# Patient Record
Sex: FEMALE | Race: BLACK OR AFRICAN AMERICAN | NOT HISPANIC OR LATINO | Employment: FULL TIME | URBAN - METROPOLITAN AREA
[De-identification: names, ages, dates, MRNs, and addresses within clinical notes are randomized per-mention and may not be internally consistent; named-entity substitution may affect disease eponyms.]

---

## 2023-02-08 PROBLEM — R73.03 PREDIABETES: Status: ACTIVE | Noted: 2023-02-08

## 2023-02-08 PROBLEM — N91.2 AMENORRHEA: Status: ACTIVE | Noted: 2023-02-08

## 2023-02-08 PROBLEM — J02.0 STREP PHARYNGITIS: Status: RESOLVED | Noted: 2023-02-08 | Resolved: 2023-02-08

## 2023-02-08 PROBLEM — E66.01 CLASS 3 SEVERE OBESITY WITHOUT SERIOUS COMORBIDITY WITH BODY MASS INDEX (BMI) OF 40.0 TO 44.9 IN ADULT (MULTI): Status: ACTIVE | Noted: 2023-02-08

## 2023-02-08 PROBLEM — K12.1 ULCER MOUTH: Status: RESOLVED | Noted: 2023-02-08 | Resolved: 2023-02-08

## 2023-02-08 PROBLEM — E66.813 CLASS 3 SEVERE OBESITY WITHOUT SERIOUS COMORBIDITY WITH BODY MASS INDEX (BMI) OF 40.0 TO 44.9 IN ADULT: Status: ACTIVE | Noted: 2023-02-08

## 2023-02-08 PROBLEM — I26.99 PULMONARY EMBOLISM (MULTI): Status: ACTIVE | Noted: 2023-02-08

## 2023-02-08 PROBLEM — J02.9 SORE THROAT: Status: RESOLVED | Noted: 2023-02-08 | Resolved: 2023-02-08

## 2023-02-08 RX ORDER — SEMAGLUTIDE 1.34 MG/ML
INJECTION, SOLUTION SUBCUTANEOUS
COMMUNITY
End: 2023-04-05 | Stop reason: SDUPTHER

## 2023-04-05 ENCOUNTER — OFFICE VISIT (OUTPATIENT)
Dept: PRIMARY CARE | Facility: CLINIC | Age: 28
End: 2023-04-05
Payer: COMMERCIAL

## 2023-04-05 VITALS
TEMPERATURE: 97 F | DIASTOLIC BLOOD PRESSURE: 77 MMHG | OXYGEN SATURATION: 100 % | WEIGHT: 270.8 LBS | HEART RATE: 94 BPM | HEIGHT: 68 IN | SYSTOLIC BLOOD PRESSURE: 108 MMHG | BODY MASS INDEX: 41.04 KG/M2

## 2023-04-05 DIAGNOSIS — R73.03 PREDIABETES: ICD-10-CM

## 2023-04-05 DIAGNOSIS — E66.01 CLASS 3 SEVERE OBESITY DUE TO EXCESS CALORIES WITHOUT SERIOUS COMORBIDITY WITH BODY MASS INDEX (BMI) OF 40.0 TO 44.9 IN ADULT (MULTI): Primary | ICD-10-CM

## 2023-04-05 PROCEDURE — 1036F TOBACCO NON-USER: CPT | Performed by: NURSE PRACTITIONER

## 2023-04-05 PROCEDURE — 99213 OFFICE O/P EST LOW 20 MIN: CPT | Performed by: NURSE PRACTITIONER

## 2023-04-05 PROCEDURE — 3008F BODY MASS INDEX DOCD: CPT | Performed by: NURSE PRACTITIONER

## 2023-04-05 RX ORDER — SEMAGLUTIDE 1.34 MG/ML
INJECTION, SOLUTION SUBCUTANEOUS
Qty: 1.5 ML | Refills: 2 | Status: SHIPPED | OUTPATIENT
Start: 2023-04-05 | End: 2023-04-11

## 2023-04-05 RX ORDER — BUTALBITAL, ACETAMINOPHEN AND CAFFEINE 50; 325; 40 MG/1; MG/1; MG/1
2 TABLET ORAL EVERY 4 HOURS PRN
COMMUNITY
Start: 2023-02-27 | End: 2023-08-08 | Stop reason: ALTCHOICE

## 2023-04-05 NOTE — PROGRESS NOTES
Subjective   Patient ID: Matti Womack is a 27 y.o. female who presents for med fuv.    HPI     She has lost 15 lbs on Ozempic in the last 6 weeks. Her diet is better. She is limiting her sugar intake. She is more active but does not go to the gym. She feels more energized.     She does note some constipation, worse with increasing her dose to 0.5 mg. She has a bowel movement about once every 3 days. She has not taken anything for the constipation.     She has not yet scheduled with a dietician but plans to do so.     Review of Systems  ROS negative except as noted above in HPI.       Objective   /77   Pulse 94   Temp 36.1 °C (97 °F)   Wt 123 kg (270 lb 12.8 oz)   SpO2 100%     Physical Exam  General: Alert and oriented, in no acute distress. Appears stated age, well-nourished, and well hydrated  HEENT:  - Head: Normocephalic and atraumatic   - Eyes: EOMI, PERRLA  - ENT: Hearing grossly intact  Heart: RRR. No murmurs, clicks, or rubs  Lungs: Unlabored breathing. CTAB with no crackles, wheezes, or rhonchi  Abdomen: Normal BS in all 4 quadrants. Soft, non-tender, non-distended, with no masses  Extremities: Warm and well perfused. No edema. Normal peripheral pulses  Musculoskeletal: Normal gait and station  Neurological: Alert and oriented. No gross neurological deficits  Psychological: Appropriate mood and affect  Skin: No rash, abnormal lesions, cyanosis, or erythema      Assessment/Plan   Prediabetes  Obesity  - Has lost 15 lbs on Ozempic in the last 6 weeks  - Continue Ozempic 0.5 mg weekly  - Recommended stool softener to help with constipation  - Continue improved diet, encouraged to exercise  - Schedule with dietician    RTC in 3 months for annual physical and prediabetes/obesity    Reviewed and approved by LEA SUÁREZ on 4/5/23 at 12:25 PM.

## 2023-04-10 DIAGNOSIS — R73.03 PREDIABETES: ICD-10-CM

## 2023-04-10 DIAGNOSIS — E66.01 CLASS 3 SEVERE OBESITY DUE TO EXCESS CALORIES WITHOUT SERIOUS COMORBIDITY WITH BODY MASS INDEX (BMI) OF 40.0 TO 44.9 IN ADULT (MULTI): ICD-10-CM

## 2023-04-11 DIAGNOSIS — R73.03 PREDIABETES: Primary | ICD-10-CM

## 2023-04-11 DIAGNOSIS — E66.01 CLASS 3 SEVERE OBESITY DUE TO EXCESS CALORIES WITHOUT SERIOUS COMORBIDITY WITH BODY MASS INDEX (BMI) OF 40.0 TO 44.9 IN ADULT (MULTI): ICD-10-CM

## 2023-04-11 RX ORDER — SEMAGLUTIDE 1.34 MG/ML
INJECTION, SOLUTION SUBCUTANEOUS
OUTPATIENT
Start: 2023-04-11

## 2023-04-26 ENCOUNTER — TELEPHONE (OUTPATIENT)
Dept: PRIMARY CARE | Facility: CLINIC | Age: 28
End: 2023-04-26

## 2023-07-05 ENCOUNTER — APPOINTMENT (OUTPATIENT)
Dept: PRIMARY CARE | Facility: CLINIC | Age: 28
End: 2023-07-05
Payer: COMMERCIAL

## 2023-07-13 ENCOUNTER — APPOINTMENT (OUTPATIENT)
Dept: PRIMARY CARE | Facility: CLINIC | Age: 28
End: 2023-07-13
Payer: COMMERCIAL

## 2023-07-20 ENCOUNTER — APPOINTMENT (OUTPATIENT)
Dept: PRIMARY CARE | Facility: CLINIC | Age: 28
End: 2023-07-20
Payer: COMMERCIAL

## 2023-08-08 ENCOUNTER — TELEMEDICINE (OUTPATIENT)
Dept: PRIMARY CARE | Facility: CLINIC | Age: 28
End: 2023-08-08
Payer: COMMERCIAL

## 2023-08-08 VITALS
DIASTOLIC BLOOD PRESSURE: 71 MMHG | WEIGHT: 274 LBS | SYSTOLIC BLOOD PRESSURE: 104 MMHG | BODY MASS INDEX: 41.52 KG/M2 | HEIGHT: 68 IN | HEART RATE: 79 BPM

## 2023-08-08 DIAGNOSIS — Z00.00 ANNUAL PHYSICAL EXAM: Primary | ICD-10-CM

## 2023-08-08 DIAGNOSIS — D64.9 ANEMIA, UNSPECIFIED TYPE: ICD-10-CM

## 2023-08-08 DIAGNOSIS — G43.E09 CHRONIC MIGRAINE WITH AURA: ICD-10-CM

## 2023-08-08 DIAGNOSIS — E66.01 OBESITY, CLASS III, BMI 40-49.9 (MORBID OBESITY) (MULTI): ICD-10-CM

## 2023-08-08 DIAGNOSIS — R73.03 PREDIABETES: ICD-10-CM

## 2023-08-08 PROBLEM — E66.813 OBESITY, CLASS III, BMI 40-49.9 (MORBID OBESITY): Status: ACTIVE | Noted: 2017-07-27

## 2023-08-08 PROBLEM — J45.909 ASTHMA (HHS-HCC): Status: ACTIVE | Noted: 2019-02-01

## 2023-08-08 PROBLEM — G40.909 SEIZURE DISORDER (MULTI): Status: ACTIVE | Noted: 2019-02-01

## 2023-08-08 LAB
CHOLESTEROL (MG/DL) IN SER/PLAS: 125 MG/DL (ref 0–199)
CHOLESTEROL IN HDL (MG/DL) IN SER/PLAS: 40.6 MG/DL
CHOLESTEROL/HDL RATIO: 3.1
FERRITIN (UG/LL) IN SER/PLAS: 81 UG/L (ref 8–150)
IRON (UG/DL) IN SER/PLAS: 41 UG/DL (ref 35–150)
IRON BINDING CAPACITY (UG/DL) IN SER/PLAS: 374 UG/DL (ref 240–445)
IRON SATURATION (%) IN SER/PLAS: 11 % (ref 25–45)
LDL: 72 MG/DL (ref 0–99)
POC HEMOGLOBIN A1C: 5.9 % (ref 4.2–6.5)
TRANSFERRIN (MG/DL) IN SER/PLAS: 285 MG/DL (ref 200–360)
TRIGLYCERIDE (MG/DL) IN SER/PLAS: 64 MG/DL (ref 0–149)
VLDL: 13 MG/DL (ref 0–40)

## 2023-08-08 PROCEDURE — 99214 OFFICE O/P EST MOD 30 MIN: CPT | Performed by: NURSE PRACTITIONER

## 2023-08-08 PROCEDURE — 84466 ASSAY OF TRANSFERRIN: CPT

## 2023-08-08 PROCEDURE — 80061 LIPID PANEL: CPT

## 2023-08-08 PROCEDURE — 99395 PREV VISIT EST AGE 18-39: CPT | Performed by: NURSE PRACTITIONER

## 2023-08-08 PROCEDURE — 83540 ASSAY OF IRON: CPT

## 2023-08-08 PROCEDURE — 82728 ASSAY OF FERRITIN: CPT

## 2023-08-08 PROCEDURE — 83036 HEMOGLOBIN GLYCOSYLATED A1C: CPT | Performed by: NURSE PRACTITIONER

## 2023-08-08 RX ORDER — SUMATRIPTAN 50 MG/1
50 TABLET, FILM COATED ORAL ONCE AS NEEDED
Qty: 9 TABLET | Refills: 0 | Status: SHIPPED | OUTPATIENT
Start: 2023-08-08 | End: 2023-09-11

## 2023-08-08 NOTE — PROGRESS NOTES
Matti Womack is a 28 y.o. female who is presenting for annual physical exam.     Reports chronic migraines. Reports migraines either to one side of her head or her forehead. Gets associated light and noise sensitivity. Gets an aura. Denies nausea or vomiting. Unsure what triggers them. Gets a migraines about 4 times per month. They last anywhere from a few hours to a day.  Motrin 800 mg and Fioricet helps a little. Would like to try something other than Fioricet.     Last  Visit: unknown  Reported Health: Fair    Dental, Vision, Hearing:  Regular dental visits: no  - Brushes teeth 2 times per day  - Flosses? no, not regularly  Vision problems: yes  - Wears glasses or contacts? Supposed to wear glasses   - Last eye exam:  unknown  Hearing loss: no    Immunization status:  Up to date: yes    Lifestyle:  Healthy diet: okay  Regular exercise: no, active though  - Exercise frequency: none  - Type of exercise: none  Alcohol: yes, occasionally   Tobacco: no  Drugs: yes, marjiuana    Reproductive Health:  Menstrual problems: no  - LMP: does not get them on IUD  Sexually active: yes, one male partner  Contraception: IUD    Pregnancy History:   : 4  Parity: 4  - Full term: 4  - Premature:  - (s):  - Living:  - AB Induced:  - AB Spont:  - Ectopic:   - Multiple births:    Cervical Cancer Screening:  Last pap: unknown date2018?  History of abnormal pap smear? no  Breast Cancer Screening:  Last mammogram:  none  Colorectal Cancer Screening:  Last colonoscopy or Cologuard:  none  Metabolic Screening:  Lipid profile: May 2022  Glucose screening: 2023    Gen: denies fever, chills, weight loss, fatigue  HEENT: denies sinus pressure, sinus congestion, runny nose, red eyes, itchy eyes, vision loss, ear pain, hearing loss, throat pain, trouble swallowing  Neck: denies neck pain, neck swelling or masses  Chest/breast: denies breast pain, breast lumps, nipple discharge  CV: denies chest pain, palpitations,  fast heart rate, syncope  Resp: denies shortness of breath, cough, wheezing  GI: denies abdominal pain, nausea, diarrhea, constipation, hematochezia, melena  : denies dysuria, hematuria, vaginal discharge, frequency  Endo: denies polydipsia, polyuria, heat/cold intolerance, weight change, hair thinning  Heme: denies easy bruising, easy bleeding  Neuro: as noted in HPI. denies numbness, tingling, memory loss, changes in vision  MSK: denies joint pain, joint swelling, weakness  Psych: denies suicidal ideation, homicidal ideation, depression, anxiety, mood swings  Skin: denies rashes, abnormal lesions, itching, changes in moles     Previous History  Past Medical History:   Diagnosis Date    Encounter for removal of intrauterine contraceptive device 05/05/2016    Encounter for removal of intrauterine contraceptive device    Encounter for routine postpartum follow-up 04/28/2014    Postpartum exam    Encounter for supervision of normal pregnancy, unspecified, second trimester 02/17/2017    Second trimester pregnancy    Encounter for supervision of normal pregnancy, unspecified, third trimester 05/19/2017    Third trimester pregnancy at less than 36 weeks    Other specified health status 05/16/2014    Contraception    Personal history of contraception 04/28/2014    Personal history of contraceptive use    Sore throat 02/08/2023    Strep pharyngitis 02/08/2023    Ulcer mouth 02/08/2023     No past surgical history on file.  Social History     Tobacco Use    Smoking status: Never    Smokeless tobacco: Never     Family History   Problem Relation Name Age of Onset    No Known Problems Mother       No Known Allergies  Current Outpatient Medications   Medication Instructions    butalbital-acetaminophen-caff -40 mg tablet 2 tablets, oral, Every 4 hours PRN    semaglutide 0.5 mg, subcutaneous, Weekly       Physical Exam  General: Alert and oriented, in no acute distress. Appears stated age, well-nourished, and well  hydrated  HEENT:  - Head: Normocephalic and atraumatic   - Eyes: EOMI, PERRLA  - ENT: Hearing grossly intact. Mucus membranes pink and moist without lesions. Tonsils present without swelling or exudates. Good dentition. TMs gray  Neck: Supple. No stiffness. No thyromegaly or thyroid nodules  Heart: RRR. No murmurs, clicks, or rubs  Lungs: Unlabored breathing. CTAB with no crackles, wheezes, or rhonchi  Abdomen: Normal BS in all 4 quadrants. Soft, non-tender, non-distended, with no masses  Extremities: Warm and well perfused. No edema. Normal peripheral pulses  Musculoskeletal: ROM intact. Strength 5/5 in BUE and BLE. No joint swelling. Normal gait and station  Neurological: Alert and oriented. No gross neurological deficits. Normal sensation. No weakness. DTRs +2/4   Psychological: Appropriate mood and affect  Skin: No rash, abnormal lesions, cyanosis, or erythema      Assessment and Plan     Annual Physical  - Up to date on vaccines  - Due for pap, will return at a later date  - Check labs: lipid panel, CBCd, iron studies  - Declined STI screening  - Maintain healthy lifestyle    Chronic migraines  - RX sumatriptan 50 mg PRN  - Encouraged to keep headache diary  - Avoidance of known triggers    Prediabetes  - IO HgA1c 5.9%  - Schedule with dietician  - Lifestyle management    RTC in 2 weeks for pap    AXEL Sheldon-CNP  [facility]

## 2023-08-10 DIAGNOSIS — D64.9 ANEMIA, UNSPECIFIED TYPE: Primary | ICD-10-CM

## 2023-08-22 ENCOUNTER — APPOINTMENT (OUTPATIENT)
Dept: PRIMARY CARE | Facility: CLINIC | Age: 28
End: 2023-08-22
Payer: COMMERCIAL

## 2023-09-07 ENCOUNTER — TELEPHONE (OUTPATIENT)
Dept: PRIMARY CARE | Facility: CLINIC | Age: 28
End: 2023-09-07
Payer: COMMERCIAL

## 2023-09-07 NOTE — TELEPHONE ENCOUNTER
Spoke with patient       Yes there are plenty of options, but we will need to discuss at her appointment.

## 2023-09-09 DIAGNOSIS — G43.E09 CHRONIC MIGRAINE WITH AURA: ICD-10-CM

## 2023-09-11 RX ORDER — SUMATRIPTAN 50 MG/1
50 TABLET, FILM COATED ORAL ONCE AS NEEDED
Qty: 9 TABLET | Refills: 0 | Status: SHIPPED | OUTPATIENT
Start: 2023-09-11 | End: 2023-09-22

## 2023-09-22 ENCOUNTER — PROCEDURE VISIT (OUTPATIENT)
Dept: PRIMARY CARE | Facility: CLINIC | Age: 28
End: 2023-09-22
Payer: COMMERCIAL

## 2023-09-22 VITALS
OXYGEN SATURATION: 100 % | HEIGHT: 68 IN | BODY MASS INDEX: 41.46 KG/M2 | SYSTOLIC BLOOD PRESSURE: 109 MMHG | WEIGHT: 273.6 LBS | HEART RATE: 77 BPM | DIASTOLIC BLOOD PRESSURE: 72 MMHG

## 2023-09-22 DIAGNOSIS — Z11.3 ROUTINE SCREENING FOR STI (SEXUALLY TRANSMITTED INFECTION): ICD-10-CM

## 2023-09-22 DIAGNOSIS — Z12.4 CERVICAL CANCER SCREENING: ICD-10-CM

## 2023-09-22 DIAGNOSIS — G43.109 MIGRAINE WITH AURA AND WITHOUT STATUS MIGRAINOSUS, NOT INTRACTABLE: Primary | ICD-10-CM

## 2023-09-22 DIAGNOSIS — D64.9 ANEMIA, UNSPECIFIED TYPE: ICD-10-CM

## 2023-09-22 LAB
BASOPHILS (10*3/UL) IN BLOOD BY AUTOMATED COUNT: 0.04 X10E9/L (ref 0–0.1)
BASOPHILS/100 LEUKOCYTES IN BLOOD BY AUTOMATED COUNT: 0.7 % (ref 0–2)
EOSINOPHILS (10*3/UL) IN BLOOD BY AUTOMATED COUNT: 0.09 X10E9/L (ref 0–0.7)
EOSINOPHILS/100 LEUKOCYTES IN BLOOD BY AUTOMATED COUNT: 1.5 % (ref 0–6)
ERYTHROCYTE DISTRIBUTION WIDTH (RATIO) BY AUTOMATED COUNT: 18.2 % (ref 11.5–14.5)
ERYTHROCYTE MEAN CORPUSCULAR HEMOGLOBIN CONCENTRATION (G/DL) BY AUTOMATED: 30.4 G/DL (ref 32–36)
ERYTHROCYTE MEAN CORPUSCULAR VOLUME (FL) BY AUTOMATED COUNT: 80 FL (ref 80–100)
ERYTHROCYTES (10*6/UL) IN BLOOD BY AUTOMATED COUNT: 4.62 X10E12/L (ref 4–5.2)
HEMATOCRIT (%) IN BLOOD BY AUTOMATED COUNT: 36.8 % (ref 36–46)
HEMOGLOBIN (G/DL) IN BLOOD: 11.2 G/DL (ref 12–16)
IMMATURE GRANULOCYTES/100 LEUKOCYTES IN BLOOD BY AUTOMATED COUNT: 0.2 % (ref 0–0.9)
LEUKOCYTES (10*3/UL) IN BLOOD BY AUTOMATED COUNT: 6.1 X10E9/L (ref 4.4–11.3)
LYMPHOCYTES (10*3/UL) IN BLOOD BY AUTOMATED COUNT: 2.53 X10E9/L (ref 1.2–4.8)
LYMPHOCYTES/100 LEUKOCYTES IN BLOOD BY AUTOMATED COUNT: 41.4 % (ref 13–44)
MONOCYTES (10*3/UL) IN BLOOD BY AUTOMATED COUNT: 0.58 X10E9/L (ref 0.1–1)
MONOCYTES/100 LEUKOCYTES IN BLOOD BY AUTOMATED COUNT: 9.5 % (ref 2–10)
NEUTROPHILS (10*3/UL) IN BLOOD BY AUTOMATED COUNT: 2.86 X10E9/L (ref 1.2–7.7)
NEUTROPHILS/100 LEUKOCYTES IN BLOOD BY AUTOMATED COUNT: 46.7 % (ref 40–80)
NRBC (PER 100 WBCS) BY AUTOMATED COUNT: 0 /100 WBC (ref 0–0)
PLATELETS (10*3/UL) IN BLOOD AUTOMATED COUNT: 196 X10E9/L (ref 150–450)

## 2023-09-22 PROCEDURE — 87591 N.GONORRHOEAE DNA AMP PROB: CPT

## 2023-09-22 PROCEDURE — 99214 OFFICE O/P EST MOD 30 MIN: CPT | Performed by: NURSE PRACTITIONER

## 2023-09-22 PROCEDURE — 87661 TRICHOMONAS VAGINALIS AMPLIF: CPT

## 2023-09-22 PROCEDURE — 88175 CYTOPATH C/V AUTO FLUID REDO: CPT

## 2023-09-22 PROCEDURE — 85025 COMPLETE CBC W/AUTO DIFF WBC: CPT

## 2023-09-22 PROCEDURE — 87491 CHLMYD TRACH DNA AMP PROBE: CPT

## 2023-09-22 RX ORDER — RIZATRIPTAN BENZOATE 5 MG/1
5 TABLET, ORALLY DISINTEGRATING ORAL ONCE AS NEEDED
Qty: 9 TABLET | Refills: 0 | Status: SHIPPED | OUTPATIENT
Start: 2023-09-22 | End: 2023-10-23

## 2023-09-22 RX ORDER — TOPIRAMATE 25 MG/1
TABLET ORAL
Qty: 70 TABLET | Refills: 0 | Status: SHIPPED | OUTPATIENT
Start: 2023-09-22 | End: 2023-10-19

## 2023-09-22 NOTE — PATIENT INSTRUCTIONS
"Migraines in adults    The Basics  Written by the doctors and editors at [facility]  What are migraines? -- Migraines are a kind of headache that can also involve other symptoms. Migraines can affect both adults and children. They are more common in females than in males. Migraines often start mild and then get worse.  What are the symptoms of migraines in adults? -- Symptoms can include:  ?Headache - The headache gets worse over several hours and is usually throbbing. It often affects 1 side of the head.  ?Nausea and sometimes vomiting  ?Sensitivity to light and noise - Lying down in a quiet, dark room often helps.  ?Aura - Some people have something called a migraine \"aura.\" An aura is a symptom or feeling that happens before or during the migraine headache. Each person's aura is different, but in most cases the aura affects your vision. You might see flashing lights, bright spots, or zig-zag lines, or lose part of your vision. Or you might have numbness and tingling of the lips, lower face, and fingers of 1 hand. Some people hear sounds or have ringing in their ears as part of their aura. The aura usually lasts a few minutes to an hour and then goes away, but most often lasts 15 to 30 minutes.  People who get migraines with aura usually cannot take birth control pills. That's because they might increase the risk of stroke.  Many people get other symptoms of migraine that happen several hours or even a day before the headache. Doctors call these \"premonitory\" or \"prodromal\" symptoms. They might include yawning, feeling depressed, irritability, food cravings, constipation, or a stiff neck.  Is there a test for migraines? -- No. There is no test. But your doctor should be able to tell if you have migraines by doing an exam and learning about your symptoms.  Should I see a doctor or nurse? -- Yes. If you think you are having migraines, you should talk to your doctor or nurse. You should also see a doctor or nurse if " "your migraines get worse or more frequent, or if you have new symptoms.  Is there anything I can do to prevent migraines? -- Yes. Some people find that their migraines are triggered by certain things. If you can avoid some of these things, you can lower your chances of getting migraines.  You can also keep a \"headache diary.\" In the diary, write down every time you have a migraine and what you ate and did before it started. That way you can find out if there is anything you should avoid eating or doing. You can also write down what medicine you took and whether or not it helped.  Common migraine triggers include:  ?Stress  ?Hormonal changes  ?Skipping meals or not eating enough  ?Changes in the weather  ?Sleeping too much or too little  ?Bright or flashing lights  ?Drinking alcohol  ?Eating certain foods, such as aged cheese and hot dogs  ?Smoking or being around smoke  If your migraines are frequent or severe, your doctor can suggest other ways to help prevent them. For example, it might help to learn relaxation techniques and ways to manage stress. There are also medicines that can help.  Some people get migraines just before or during their period. Medicine can help with this, too.  How are migraines treated? -- There are many different medicines that can help with migraines. Your doctor can help you find the best treatment for your situation.  For mild migraines, your doctor might suggest an over-the-counter medicine such as acetaminophen (sample brand name: Tylenol), ibuprofen (sample brand names: Advil, Motrin), or naproxen (sample brand name: Aleve). There is also a medicine that combines acetaminophen, aspirin, and caffeine (sample brand name: Excedrin).  For more severe migraines, there are prescription medicines that can help. Some, such as medicines called triptans, help to relieve the pain from a migraine attack. Other prescription medicines can help to make migraine attacks happen less often. If you " have severe nausea or vomiting with your migraines, there are medicines that can help with that, too.  Do not try to treat frequent migraines on your own with non-prescription pain medicines. Taking non-prescription pain medicines too often can actually cause more headaches later.  Is there anything I can do on my own to feel better? -- Yes. You can try:  ?Resting in a quiet, dark room with a cool cloth on your forehead  ?Sleeping  ?Taking the medicine or medicines that you have talked to your doctor about - You should not take medicines that you have not discussed with your doctor.  What if I want to get pregnant? -- If you want to get pregnant, talk to your doctor or nurse before you start trying. Some medicines used to treat and prevent migraines are not safe during pregnancy, so you might need to switch medicines before you get pregnant.  Some people notice that their migraines actually get better during pregnancy and breastfeeding. This is related to hormonal changes in the body.  All topics are updated as new evidence becomes available and our peer review process is complete.  This topic retrieved from [employer] on: Sep 05, 2023.  Topic 398420 Version 7.0  Release: 31.3.5 - C31.247  © 2023 [employer], [employer]. and/or its affiliates. All rights reserved.

## 2023-09-23 LAB
CHLAMYDIA TRACH., AMPLIFIED: NEGATIVE
N. GONORRHEA, AMPLIFIED: NEGATIVE
TRICHOMONAS VAGINALIS: NEGATIVE

## 2023-09-26 DIAGNOSIS — D50.9 IRON DEFICIENCY ANEMIA, UNSPECIFIED IRON DEFICIENCY ANEMIA TYPE: Primary | ICD-10-CM

## 2023-09-26 RX ORDER — FERROUS GLUCONATE 324(38)MG
TABLET ORAL
Qty: 84 TABLET | Refills: 1 | Status: SHIPPED | OUTPATIENT
Start: 2023-09-26

## 2023-10-06 ENCOUNTER — TELEPHONE (OUTPATIENT)
Dept: PRIMARY CARE | Facility: CLINIC | Age: 28
End: 2023-10-06
Payer: COMMERCIAL

## 2023-10-06 LAB
COMPLETE PATHOLOGY REPORT: NORMAL
CONVERTED CLINICAL DIAGNOSIS-HISTORY: NORMAL
CONVERTED DIAGNOSIS COMMENT: NORMAL
CONVERTED FINAL DIAGNOSIS: NORMAL
CONVERTED FINAL REPORT PDF LINK TO COPY AND PASTE: NORMAL

## 2023-10-19 DIAGNOSIS — G43.109 MIGRAINE WITH AURA AND WITHOUT STATUS MIGRAINOSUS, NOT INTRACTABLE: ICD-10-CM

## 2023-10-19 RX ORDER — TOPIRAMATE 50 MG/1
50 TABLET, FILM COATED ORAL 2 TIMES DAILY
Qty: 180 TABLET | Refills: 1 | Status: SHIPPED | OUTPATIENT
Start: 2023-10-19 | End: 2024-04-19 | Stop reason: SDUPTHER

## 2023-10-20 ENCOUNTER — OFFICE VISIT (OUTPATIENT)
Dept: PRIMARY CARE | Facility: CLINIC | Age: 28
End: 2023-10-20
Payer: COMMERCIAL

## 2023-10-20 VITALS
HEART RATE: 75 BPM | WEIGHT: 264.4 LBS | OXYGEN SATURATION: 98 % | DIASTOLIC BLOOD PRESSURE: 72 MMHG | HEIGHT: 68 IN | BODY MASS INDEX: 40.07 KG/M2 | SYSTOLIC BLOOD PRESSURE: 106 MMHG

## 2023-10-20 DIAGNOSIS — G43.109 MIGRAINE WITH AURA AND WITHOUT STATUS MIGRAINOSUS, NOT INTRACTABLE: Primary | ICD-10-CM

## 2023-10-20 PROCEDURE — 3008F BODY MASS INDEX DOCD: CPT | Performed by: NURSE PRACTITIONER

## 2023-10-20 PROCEDURE — 99213 OFFICE O/P EST LOW 20 MIN: CPT | Performed by: NURSE PRACTITIONER

## 2023-10-20 PROCEDURE — 1036F TOBACCO NON-USER: CPT | Performed by: NURSE PRACTITIONER

## 2023-10-20 RX ORDER — IBUPROFEN 800 MG/1
800 TABLET ORAL 3 TIMES DAILY PRN
Qty: 30 TABLET | Refills: 0 | Status: SHIPPED | OUTPATIENT
Start: 2023-10-20 | End: 2024-04-19 | Stop reason: SDUPTHER

## 2023-10-20 NOTE — PROGRESS NOTES
"Subjective   Patient ID: Matti Womack is a 28 y.o. female who presents for Migraine (Fuv/).    HPI     She was started on topiramate at her last appointment on 9/22/23. She states it's been working great. She had not had a migraine or headache up until 3 days ago. She started taking three 25 mg tablets BID then. She has had a migraine the last 3 days, relieved with ibuprofen. (Admits to taking five 200 mg tablets). She has not tried Rizatriptan yet.     Unsure of what triggers her migraines. Believes it's work stress. Headaches are located to either one side of her head or her forehead. Gets associated light and noise sensitivity. Gets an aura. Denies nausea or vomiting.     Review of Systems  ROS negative except as noted above in HPI.       Objective   /72   Pulse 75   Ht 1.727 m (5' 8\")   Wt 120 kg (264 lb 6.4 oz)   SpO2 98%   BMI 40.20 kg/m²     Physical Exam  General: Alert and oriented, in no acute distress. Appears stated age, well-nourished, and well hydrated  HEENT:  - Head: Normocephalic and atraumatic   - Eyes: EOMI, PERRLA  - ENT: Hearing grossly intact  Heart: RRR. No murmurs, clicks, or rubs  Lungs: Unlabored breathing. CTAB with no crackles, wheezes, or rhonchi  Abdomen: Normal BS in all 4 quadrants. Soft, non-tender, non-distended, with no masses  Extremities: Warm and well perfused. No edema. Normal peripheral pulses  Musculoskeletal: Normal gait and station  Neurological: Alert and oriented. No gross neurological deficits  Psychological: Appropriate mood and affect  Skin: No rash, abnormal lesions, cyanosis, or erythema      Assessment/Plan   Migraines  - Improved, did have a headache the last 3 days and increased her topiramate to 75 mg BID on her own  - Will go back to topiramate 50 mg BID (does not seem that 75 mg BID was helping anyway since she still got headaches with this dose)  - Rx sent for ibuprofen 800 mg TID prn migraines (advised max dose is 800 mg Q8H)  - Avoidance of " known triggers when possible  - Can take rizatriptan prn     RTC in 3 months for migraines or sooner prn    AXEL Sheldon-CNP  [facility]

## 2023-10-23 DIAGNOSIS — G43.109 MIGRAINE WITH AURA AND WITHOUT STATUS MIGRAINOSUS, NOT INTRACTABLE: ICD-10-CM

## 2023-10-23 RX ORDER — RIZATRIPTAN BENZOATE 5 MG/1
5 TABLET, ORALLY DISINTEGRATING ORAL ONCE AS NEEDED
Qty: 9 TABLET | Refills: 0 | Status: SHIPPED | OUTPATIENT
Start: 2023-10-23 | End: 2024-01-19

## 2023-11-01 DIAGNOSIS — G43.109 MIGRAINE WITH AURA AND WITHOUT STATUS MIGRAINOSUS, NOT INTRACTABLE: Primary | ICD-10-CM

## 2024-01-11 DIAGNOSIS — G43.109 MIGRAINE WITH AURA AND WITHOUT STATUS MIGRAINOSUS, NOT INTRACTABLE: Primary | ICD-10-CM

## 2024-01-19 ENCOUNTER — OFFICE VISIT (OUTPATIENT)
Dept: PRIMARY CARE | Facility: CLINIC | Age: 29
End: 2024-01-19
Payer: COMMERCIAL

## 2024-01-19 VITALS
BODY MASS INDEX: 40.16 KG/M2 | DIASTOLIC BLOOD PRESSURE: 78 MMHG | WEIGHT: 265 LBS | SYSTOLIC BLOOD PRESSURE: 113 MMHG | HEART RATE: 82 BPM | HEIGHT: 68 IN

## 2024-01-19 DIAGNOSIS — G40.A09 ABSENCE SEIZURE (MULTI): ICD-10-CM

## 2024-01-19 DIAGNOSIS — E66.01 OBESITY, CLASS III, BMI 40-49.9 (MORBID OBESITY) (MULTI): ICD-10-CM

## 2024-01-19 DIAGNOSIS — J45.20 MILD INTERMITTENT ASTHMA WITHOUT COMPLICATION (HHS-HCC): Primary | ICD-10-CM

## 2024-01-19 DIAGNOSIS — G43.109 MIGRAINE WITH AURA AND WITHOUT STATUS MIGRAINOSUS, NOT INTRACTABLE: ICD-10-CM

## 2024-01-19 DIAGNOSIS — R29.818 SUSPECTED SLEEP APNEA: ICD-10-CM

## 2024-01-19 PROBLEM — O99.210 MATERNAL MORBID OBESITY, ANTEPARTUM (MULTI): Status: RESOLVED | Noted: 2019-02-21 | Resolved: 2024-01-19

## 2024-01-19 PROBLEM — O99.210 MATERNAL MORBID OBESITY, ANTEPARTUM (MULTI): Status: ACTIVE | Noted: 2019-02-21

## 2024-01-19 PROBLEM — I26.99 PULMONARY EMBOLISM (MULTI): Status: ACTIVE | Noted: 2018-06-14

## 2024-01-19 PROBLEM — I26.99 PULMONARY EMBOLISM (MULTI): Status: RESOLVED | Noted: 2018-06-14 | Resolved: 2024-01-19

## 2024-01-19 PROCEDURE — 3008F BODY MASS INDEX DOCD: CPT | Performed by: NURSE PRACTITIONER

## 2024-01-19 PROCEDURE — 99214 OFFICE O/P EST MOD 30 MIN: CPT | Performed by: NURSE PRACTITIONER

## 2024-01-19 PROCEDURE — 1036F TOBACCO NON-USER: CPT | Performed by: NURSE PRACTITIONER

## 2024-01-19 RX ORDER — ALBUTEROL SULFATE 90 UG/1
2 AEROSOL, METERED RESPIRATORY (INHALATION) EVERY 4 HOURS PRN
Qty: 18 G | Refills: 1 | Status: SHIPPED | OUTPATIENT
Start: 2024-01-19 | End: 2025-01-18

## 2024-01-19 RX ORDER — LEVONORGESTREL 52 MG/1
1 INTRAUTERINE DEVICE INTRAUTERINE ONCE
COMMUNITY

## 2024-01-19 ASSESSMENT — ENCOUNTER SYMPTOMS
LOSS OF SENSATION IN FEET: 0
DEPRESSION: 0
OCCASIONAL FEELINGS OF UNSTEADINESS: 0

## 2024-01-19 NOTE — PROGRESS NOTES
"Subjective   Patient ID: Matti Womack is a 28 y.o. female who presents for chronic care    HPI     Compliant with topiramate. Getting headaches once or twice per week and getting migraines about once per week. She did have a headache daily last week. Mild headaches are located to her forehead. States her migraines are also located to her forehead and feels like someone is tying a tight string around her head. Gets associated light and noise sensitivity. Gets an aura. Denies nausea or vomiting.  She will take an ibuprofen 800 mg which does help with her headaches. Has not tried taking rizaptriptan because it makes her drowsy. She has not picked up Nurtec because insurance is waiting on a PA. Unsure of what triggers her migraines. Believes it's work stress.   She does wake up with at times headaches. She does snore. Denies coughing or choking in her sleep. Denies witnessed apneic episodes.     Asthma well controlled. Uses a nebulizer a few times per year, usually if she's sick.     Has not had a seizure since 2014. She states she will blank stare and had numbness/tingling to her extremities.     Review of Systems  ROS negative except as noted above in HPI.       Objective   /78   Pulse 82   Ht 1.727 m (5' 8\")   Wt 120 kg (265 lb)   BMI 40.29 kg/m²     Physical Exam  General: Alert and oriented, in no acute distress. Appears stated age, well-nourished, and well hydrated  HEENT:  - Head: Normocephalic and atraumatic   - Eyes: EOMI, PERRLA  - ENT: Hearing grossly intact  Heart: RRR. No murmurs, clicks, or rubs  Lungs: Unlabored breathing. CTAB with no crackles, wheezes, or rhonchi  Abdomen: Normal BS in all 4 quadrants. Soft, non-tender, non-distended, with no masses  Extremities: Warm and well perfused. No edema. Normal peripheral pulses  Musculoskeletal: Normal gait and station  Neurological: Alert and oriented. No gross neurological deficits  Psychological: Appropriate mood and affect  Skin: No rash, " abnormal lesions, cyanosis, or erythema      Assessment/Plan   Migraines  - Getting migraines about once per week  - Continue topiramate 50 mg BID  - Can continue ibuprofen 800 mg TID prn, does not take rizatriptan because it makes her drowsy  - Waiting on PA for Nurtec   - Avoidance of known triggers when possible; advised to keep a headache diary   - If migraines do not improve, will need to consider a different preventative medication    Suspected KEVIN  - Patient snores and occasionally wakes up with headaches  - Sleep study ordered    Asthma  - Well controlled, rarely uses albuterol  - New rx placed for albuterol as she only has a nebulizer     Absence seizures  - Has not had a seizure since 2014    RTC in 3 months for chronic care or sooner prn    AXEL Sheldon-CNP  [facility]

## 2024-02-06 DIAGNOSIS — G43.109 MIGRAINE WITH AURA AND WITHOUT STATUS MIGRAINOSUS, NOT INTRACTABLE: Primary | ICD-10-CM

## 2024-02-27 DIAGNOSIS — N76.0 ACUTE VAGINITIS: Primary | ICD-10-CM

## 2024-02-27 RX ORDER — METRONIDAZOLE 500 MG/1
500 TABLET ORAL 2 TIMES DAILY
Qty: 14 TABLET | Refills: 0 | Status: SHIPPED | OUTPATIENT
Start: 2024-02-27 | End: 2024-03-05

## 2024-04-19 ENCOUNTER — OFFICE VISIT (OUTPATIENT)
Dept: PRIMARY CARE | Facility: CLINIC | Age: 29
End: 2024-04-19
Payer: COMMERCIAL

## 2024-04-19 VITALS
HEART RATE: 86 BPM | HEIGHT: 68 IN | WEIGHT: 261 LBS | DIASTOLIC BLOOD PRESSURE: 73 MMHG | SYSTOLIC BLOOD PRESSURE: 128 MMHG | BODY MASS INDEX: 39.56 KG/M2

## 2024-04-19 DIAGNOSIS — Z11.3 ROUTINE SCREENING FOR STI (SEXUALLY TRANSMITTED INFECTION): ICD-10-CM

## 2024-04-19 DIAGNOSIS — G43.109 MIGRAINE WITH AURA AND WITHOUT STATUS MIGRAINOSUS, NOT INTRACTABLE: Primary | ICD-10-CM

## 2024-04-19 DIAGNOSIS — J45.20 MILD INTERMITTENT ASTHMA WITHOUT COMPLICATION (HHS-HCC): ICD-10-CM

## 2024-04-19 PROBLEM — G40.909 SEIZURE DISORDER (MULTI): Status: ACTIVE | Noted: 2019-02-01

## 2024-04-19 PROBLEM — E66.9 OBESITY WITH BODY MASS INDEX 30 OR GREATER: Status: ACTIVE | Noted: 2017-07-27

## 2024-04-19 PROCEDURE — 99214 OFFICE O/P EST MOD 30 MIN: CPT | Performed by: NURSE PRACTITIONER

## 2024-04-19 PROCEDURE — 87800 DETECT AGNT MULT DNA DIREC: CPT

## 2024-04-19 PROCEDURE — 87661 TRICHOMONAS VAGINALIS AMPLIF: CPT

## 2024-04-19 PROCEDURE — 1036F TOBACCO NON-USER: CPT | Performed by: NURSE PRACTITIONER

## 2024-04-19 RX ORDER — IBUPROFEN 800 MG/1
800 TABLET ORAL 3 TIMES DAILY PRN
Qty: 30 TABLET | Refills: 0 | Status: SHIPPED | OUTPATIENT
Start: 2024-04-19

## 2024-04-19 RX ORDER — TOPIRAMATE 50 MG/1
50 TABLET, FILM COATED ORAL 2 TIMES DAILY
Qty: 180 TABLET | Refills: 3 | Status: SHIPPED | OUTPATIENT
Start: 2024-04-19 | End: 2025-04-14

## 2024-04-19 ASSESSMENT — ENCOUNTER SYMPTOMS
DEPRESSION: 1
LOSS OF SENSATION IN FEET: 0
OCCASIONAL FEELINGS OF UNSTEADINESS: 0

## 2024-04-19 NOTE — PROGRESS NOTES
"Subjective   Patient ID: Matti Womack is a 28 y.o. female who presents for chronic care.     HPI     1. Migraines  Has only had one migraine in the last month  Compliant with topiramate  When she gets a migraine, she takes ibuprofen which typically resolves her symptoms  Has rizatriptan but does not take it  Ubrelvy and Nurtec were not approved by her insurance  Thinks her triggers are stress     2. Asthma  Well controlled  Cannot remember the last time she needed albuterol  If she ever does need albuterol, it's typically with weather changes    3. STI screening  Requesting STI screening  She states she recently had 3 male partners in a short time frame  Denies any symptoms at this time    Review of Systems  ROS negative except as noted above in HPI.     Objective   /73   Pulse 86   Ht 1.727 m (5' 8\")   Wt 118 kg (261 lb)   BMI 39.68 kg/m²     Physical Exam  General: Alert and oriented, in no acute distress. Appears stated age, well-nourished, and well hydrated  HEENT:  - Head: Normocephalic and atraumatic   - Eyes: EOMI, PERRLA  - ENT: Hearing grossly intact  Heart: RRR. No murmurs, clicks, or rubs  Lungs: Unlabored breathing. CTAB with no crackles, wheezes, or rhonchi  Abdomen: Normal BS in all 4 quadrants. Soft, non-tender, non-distended, with no masses  Extremities: Warm and well perfused. No edema. Normal peripheral pulses  Musculoskeletal: Normal gait and station  Neurological: Alert and oriented. No gross neurological deficits  Psychological: Appropriate mood and affect  Skin: No rash, abnormal lesions, cyanosis, or erythema    Assessment/Plan   1. Migraines  - Well controlled, has only had 1 in the last month  - Continue topiramate 50 mg BID   - Continue ibuprofen prn, can take rizatriptan prn but does not usually take it   - Avoidance of known triggers    2. Asthma  - Well controlled  - Continue albuterol prn    3. STI screening  - Check GC, chlamydia, trich  - Declined blood work     RTC in 4 " months for physical and chronic care or sooner prn    Lucina Ward, APRN-CNP  [facility]

## 2024-04-20 LAB
C TRACH RRNA SPEC QL NAA+PROBE: NEGATIVE
N GONORRHOEA DNA SPEC QL PROBE+SIG AMP: NEGATIVE
T VAGINALIS RRNA SPEC QL NAA+PROBE: NEGATIVE

## 2024-04-30 ENCOUNTER — HOSPITAL ENCOUNTER (EMERGENCY)
Facility: HOSPITAL | Age: 29
Discharge: HOME | End: 2024-04-30
Attending: STUDENT IN AN ORGANIZED HEALTH CARE EDUCATION/TRAINING PROGRAM
Payer: COMMERCIAL

## 2024-04-30 ENCOUNTER — APPOINTMENT (OUTPATIENT)
Dept: RADIOLOGY | Facility: HOSPITAL | Age: 29
End: 2024-04-30
Payer: COMMERCIAL

## 2024-04-30 VITALS
TEMPERATURE: 98.6 F | OXYGEN SATURATION: 100 % | HEART RATE: 96 BPM | HEIGHT: 68 IN | DIASTOLIC BLOOD PRESSURE: 70 MMHG | BODY MASS INDEX: 39.4 KG/M2 | RESPIRATION RATE: 18 BRPM | WEIGHT: 260 LBS | SYSTOLIC BLOOD PRESSURE: 115 MMHG
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
## 2024-04-30 DIAGNOSIS — S09.90XA HEAD INJURY, INITIAL ENCOUNTER: Primary | ICD-10-CM

## 2024-04-30 PROCEDURE — 70486 CT MAXILLOFACIAL W/O DYE: CPT | Performed by: STUDENT IN AN ORGANIZED HEALTH CARE EDUCATION/TRAINING PROGRAM

## 2024-04-30 PROCEDURE — 70450 CT HEAD/BRAIN W/O DYE: CPT

## 2024-04-30 PROCEDURE — 70450 CT HEAD/BRAIN W/O DYE: CPT | Performed by: STUDENT IN AN ORGANIZED HEALTH CARE EDUCATION/TRAINING PROGRAM

## 2024-04-30 PROCEDURE — 70486 CT MAXILLOFACIAL W/O DYE: CPT

## 2024-04-30 PROCEDURE — 99285 EMERGENCY DEPT VISIT HI MDM: CPT | Mod: 25

## 2024-04-30 PROCEDURE — 2500000001 HC RX 250 WO HCPCS SELF ADMINISTERED DRUGS (ALT 637 FOR MEDICARE OP): Performed by: STUDENT IN AN ORGANIZED HEALTH CARE EDUCATION/TRAINING PROGRAM

## 2024-04-30 RX ORDER — HYDROCODONE BITARTRATE AND ACETAMINOPHEN 5; 325 MG/1; MG/1
1 TABLET ORAL ONCE
Status: COMPLETED | OUTPATIENT
Start: 2024-04-30 | End: 2024-04-30

## 2024-04-30 RX ADMIN — HYDROCODONE BITARTRATE AND ACETAMINOPHEN 1 TABLET: 5; 325 TABLET ORAL at 19:01

## 2024-04-30 ASSESSMENT — PAIN SCALES - GENERAL
PAINLEVEL_OUTOF10: 0 - NO PAIN
PAINLEVEL_OUTOF10: 9
PAINLEVEL_OUTOF10: 7

## 2024-04-30 ASSESSMENT — PAIN DESCRIPTION - ORIENTATION
ORIENTATION: RIGHT
ORIENTATION: RIGHT

## 2024-04-30 ASSESSMENT — COLUMBIA-SUICIDE SEVERITY RATING SCALE - C-SSRS
1. IN THE PAST MONTH, HAVE YOU WISHED YOU WERE DEAD OR WISHED YOU COULD GO TO SLEEP AND NOT WAKE UP?: NO
2. HAVE YOU ACTUALLY HAD ANY THOUGHTS OF KILLING YOURSELF?: NO
6. HAVE YOU EVER DONE ANYTHING, STARTED TO DO ANYTHING, OR PREPARED TO DO ANYTHING TO END YOUR LIFE?: NO

## 2024-04-30 ASSESSMENT — PAIN DESCRIPTION - LOCATION
LOCATION: JAW
LOCATION: JAW

## 2024-04-30 ASSESSMENT — PAIN - FUNCTIONAL ASSESSMENT: PAIN_FUNCTIONAL_ASSESSMENT: 0-10
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
## 2024-05-01 NOTE — DISCHARGE INSTRUCTIONS
You have been seen at a [facility].  Please follow-up with your primary care provider in the next 1 to 2 days for further evaluation and routine follow-up.  Please return to the emergency room if having any worsening symptoms.  Please follow-up with any specialists if discussed during your emergency room stay.

## 2024-07-11 ENCOUNTER — APPOINTMENT (OUTPATIENT)
Dept: OBSTETRICS AND GYNECOLOGY | Facility: CLINIC | Age: 29
End: 2024-07-11
Payer: COMMERCIAL

## 2024-07-11 VITALS
WEIGHT: 256 LBS | DIASTOLIC BLOOD PRESSURE: 60 MMHG | BODY MASS INDEX: 38.8 KG/M2 | HEIGHT: 68 IN | SYSTOLIC BLOOD PRESSURE: 116 MMHG

## 2024-07-11 DIAGNOSIS — N89.8 VAGINAL DISCHARGE: ICD-10-CM

## 2024-07-11 DIAGNOSIS — Z00.00 HEALTHCARE MAINTENANCE: Primary | ICD-10-CM

## 2024-07-11 DIAGNOSIS — Z30.432 ENCOUNTER FOR IUD REMOVAL: ICD-10-CM

## 2024-07-11 PROCEDURE — 87661 TRICHOMONAS VAGINALIS AMPLIF: CPT

## 2024-07-11 PROCEDURE — 87491 CHLMYD TRACH DNA AMP PROBE: CPT

## 2024-07-11 PROCEDURE — 58301 REMOVE INTRAUTERINE DEVICE: CPT | Performed by: NURSE PRACTITIONER

## 2024-07-11 PROCEDURE — 87591 N.GONORRHOEAE DNA AMP PROB: CPT

## 2024-07-11 PROCEDURE — 1036F TOBACCO NON-USER: CPT | Performed by: NURSE PRACTITIONER

## 2024-07-11 PROCEDURE — 87205 SMEAR GRAM STAIN: CPT

## 2024-07-11 ASSESSMENT — ENCOUNTER SYMPTOMS
MUSCULOSKELETAL NEGATIVE: 0
ENDOCRINE NEGATIVE: 0
CONSTITUTIONAL NEGATIVE: 0
LOSS OF SENSATION IN FEET: 0
CARDIOVASCULAR NEGATIVE: 0
EYES NEGATIVE: 0
RESPIRATORY NEGATIVE: 0
ALLERGIC/IMMUNOLOGIC NEGATIVE: 0
DEPRESSION: 0
GASTROINTESTINAL NEGATIVE: 0
NEUROLOGICAL NEGATIVE: 0
PSYCHIATRIC NEGATIVE: 0
OCCASIONAL FEELINGS OF UNSTEADINESS: 0
HEMATOLOGIC/LYMPHATIC NEGATIVE: 0

## 2024-07-11 ASSESSMENT — LIFESTYLE VARIABLES
HOW MANY STANDARD DRINKS CONTAINING ALCOHOL DO YOU HAVE ON A TYPICAL DAY: PATIENT DOES NOT DRINK
HOW OFTEN DO YOU HAVE A DRINK CONTAINING ALCOHOL: NEVER
AUDIT-C TOTAL SCORE: 0
HOW OFTEN DO YOU HAVE SIX OR MORE DRINKS ON ONE OCCASION: NEVER
SKIP TO QUESTIONS 9-10: 1

## 2024-07-11 ASSESSMENT — PAIN SCALES - GENERAL: PAINLEVEL: 0-NO PAIN

## 2024-07-11 NOTE — PROGRESS NOTES
Patient ID: Matti Womack is a 29 y.o. female.    IUD Removal    Performed by: CHUCKY Sherman  Authorized by: CHUCKY Sherman    Procedure: IUD removal    Reason for removal: patient request    Strings visualized: yes    Tenaculum applied to cervix: no    IUD grasped by forceps: yes    Performed with ultrasound guidance: no    IUD removed: yes    Removed without complications: yes    IUD intact: yes    Cervix manually dilated: no          PNV RX sent to pharmacy    Vaginal discharge, very malodorous noted at time of removal.  Pt unaware of discharge.  Swabs obtained and sent to lab.

## 2024-07-12 LAB
C TRACH RRNA SPEC QL NAA+PROBE: NEGATIVE
CLUE CELLS VAG LPF-#/AREA: ABNORMAL /[LPF]
N GONORRHOEA DNA SPEC QL PROBE+SIG AMP: NEGATIVE
NUGENT SCORE: 1
T VAGINALIS RRNA SPEC QL NAA+PROBE: NEGATIVE
YEAST VAG WET PREP-#/AREA: PRESENT

## 2024-07-12 RX ORDER — FLUCONAZOLE 150 MG/1
150 TABLET ORAL ONCE
Qty: 2 TABLET | Refills: 0 | Status: SHIPPED | OUTPATIENT
Start: 2024-07-12 | End: 2024-07-12

## 2024-08-16 ENCOUNTER — APPOINTMENT (OUTPATIENT)
Dept: PRIMARY CARE | Facility: CLINIC | Age: 29
End: 2024-08-16
Payer: COMMERCIAL

## 2024-08-16 VITALS
DIASTOLIC BLOOD PRESSURE: 80 MMHG | HEIGHT: 68 IN | BODY MASS INDEX: 37.92 KG/M2 | WEIGHT: 250.2 LBS | HEART RATE: 80 BPM | SYSTOLIC BLOOD PRESSURE: 128 MMHG

## 2024-08-16 DIAGNOSIS — M25.561 CHRONIC PAIN OF BOTH KNEES: ICD-10-CM

## 2024-08-16 DIAGNOSIS — M79.671 BILATERAL FOOT PAIN: ICD-10-CM

## 2024-08-16 DIAGNOSIS — G43.109 MIGRAINE WITH AURA AND WITHOUT STATUS MIGRAINOSUS, NOT INTRACTABLE: ICD-10-CM

## 2024-08-16 DIAGNOSIS — Z31.9 DESIRE FOR PREGNANCY: ICD-10-CM

## 2024-08-16 DIAGNOSIS — Z00.00 ANNUAL PHYSICAL EXAM: Primary | ICD-10-CM

## 2024-08-16 DIAGNOSIS — M25.562 CHRONIC PAIN OF BOTH KNEES: ICD-10-CM

## 2024-08-16 DIAGNOSIS — G89.29 CHRONIC PAIN OF BOTH KNEES: ICD-10-CM

## 2024-08-16 DIAGNOSIS — R73.03 PREDIABETES: ICD-10-CM

## 2024-08-16 DIAGNOSIS — J45.20 MILD INTERMITTENT ASTHMA WITHOUT COMPLICATION (HHS-HCC): ICD-10-CM

## 2024-08-16 DIAGNOSIS — M79.672 BILATERAL FOOT PAIN: ICD-10-CM

## 2024-08-16 LAB
POC HEMOGLOBIN A1C: 5.4 % (ref 4.2–6.5)
PREGNANCY TEST URINE, POC: NEGATIVE

## 2024-08-16 PROCEDURE — 83036 HEMOGLOBIN GLYCOSYLATED A1C: CPT | Performed by: NURSE PRACTITIONER

## 2024-08-16 PROCEDURE — 81025 URINE PREGNANCY TEST: CPT | Performed by: NURSE PRACTITIONER

## 2024-08-16 PROCEDURE — 1036F TOBACCO NON-USER: CPT | Performed by: NURSE PRACTITIONER

## 2024-08-16 PROCEDURE — 99214 OFFICE O/P EST MOD 30 MIN: CPT | Performed by: NURSE PRACTITIONER

## 2024-08-16 PROCEDURE — 99395 PREV VISIT EST AGE 18-39: CPT | Performed by: NURSE PRACTITIONER

## 2024-08-16 PROCEDURE — 3008F BODY MASS INDEX DOCD: CPT | Performed by: NURSE PRACTITIONER

## 2024-08-16 RX ORDER — FLUCONAZOLE 150 MG/1
TABLET ORAL
COMMUNITY
Start: 2024-07-12 | End: 2024-08-16 | Stop reason: WASHOUT

## 2024-08-16 ASSESSMENT — ENCOUNTER SYMPTOMS
LOSS OF SENSATION IN FEET: 0
DEPRESSION: 0
OCCASIONAL FEELINGS OF UNSTEADINESS: 0

## 2024-08-16 NOTE — PATIENT INSTRUCTIONS
To wean off topiramate:   Take 1 tablet in the morning and 1/2 tablet in the evening for 1 week,   Then take 1/2 tablet twice daily for 1 week,   Then take 1/2 tablet in the morning for 1 week,   Then stop

## 2024-08-16 NOTE — LETTER
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    If you have any questions or concerns, please don't hesitate to call.         Sincerely,        Lucina Ward, AXEL-CNP    CC: No Recipients

## 2024-08-16 NOTE — PROGRESS NOTES
Matti Womack is a 29 y.o. female who is presenting for annual physical exam and chronic care. Would like a pregnancy test.        1. Migraines  Well controlled with topiramate, getting migraines about twice per month   When she gets a migraine, she takes ibuprofen which typically resolves her symptoms  Has rizatriptan but does not take it  Ubrelvy and Nurtec were not approved by her insurance  Thinks her triggers are stress      2. Asthma  Well controlled  Last used albuterol a couple weeks ago when it was very humid   If she ever does need albuterol, it's typically with weather changes    3. Try to conceive  She had her IUD removed last month and is trying to conceive and is requesting a pregnancy test  She is taking a prenatal vitamin   Using an pham on her phone to track ovulation    4. 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 yes  Vision problems: no  - Wears glasses or contacts? no  - Last eye exam:  years  Hearing loss: no    Immunization status:  Up to date: yes    Lifestyle:  Healthy diet: could be better  Regular exercise: no, active at work  Alcohol: no  Tobacco: no  Drugs: no    Reproductive Health:  Menstrual problems: no  - LMP:  24  Sexually active: yes  Contraception: no    Pregnancy History:   : 4  Parity: 4  - Full term:   - Premature:  - (s):  - Livin  - AB Induced:  - AB Spont:  - Ectopic:   -  Multiple births:    Cervical Cancer Screening:  Last pap:  9/22/23  History of abnormal pap smear? no  Breast Cancer Screening:  Last mammogram:  n/a  Colorectal Cancer Screening:  Last colonoscopy or Cologuard:  n/a  Metabolic Screening:  Lipid profile: 8/8/23  Glucose screen: 8/8/23    Review of Systems  Gen: denies fever, chills, weight loss, fatigue  HEENT: denies sinus pressure, sinus congestion, runny nose, red eyes, itchy eyes, vision loss, ear pain, hearing loss, throat pain, trouble swallowing  Neck: denies neck pain, neck swelling or masses  Chest/breast: denies breast pain, breast lumps, nipple discharge  CV: denies chest pain, palpitations, fast heart rate, syncope  Resp: denies shortness of breath, cough, wheezing  GI: denies abdominal pain, nausea, diarrhea, constipation, hematochezia, melena  : denies dysuria, hematuria, vaginal discharge, frequency  Endo: denies polydipsia, polyuria, heat/cold intolerance, weight change, hair thinning  Heme: denies easy bruising, easy bleeding  Neuro: as noted in HPI. denies numbness, tingling, memory loss, changes in vision  MSK: as noted in HPI. denies joint swelling, weakness  Psych: denies suicidal ideation, homicidal ideation, depression, anxiety, mood swings  Skin: denies rashes, abnormal lesions, itching, changes in moles     Previous History  Past Medical History:   Diagnosis Date    Encounter for removal of intrauterine contraceptive device 05/05/2016    Encounter for removal of intrauterine contraceptive device    Encounter for routine postpartum follow-up ([facility]) 04/28/2014    Postpartum exam    Encounter for supervision of normal pregnancy, unspecified, second trimester ([facility]) 02/17/2017    Second trimester pregnancy    Encounter for supervision of normal pregnancy, unspecified, third trimester ([facility]) 05/19/2017    Third trimester pregnancy at less than 36 weeks    Other specified health status 05/16/2014    Contraception    Personal history of  contraception 04/28/2014    Personal history of contraceptive use    Pulmonary embolism (Multi) 06/14/2018    Formatting of this note might be different from the original. see epic note 6/14/18  INTMED visit s/p hospitalization at [facility] 5/2018 for pulmonary embolus started on xarelto and eliquis which patient self d/c'd several weeks after MFM consult done  2/21/19 and started on Lovenox    Sore throat 02/08/2023    Strep pharyngitis 02/08/2023    Ulcer mouth 02/08/2023     History reviewed. No pertinent surgical history.  Social History     Tobacco Use    Smoking status: Never    Smokeless tobacco: Never   Substance Use Topics    Alcohol use: Yes     Comment: occasional    Drug use: Yes     Types: Marijuana     Comment: daily     Family History   Problem Relation Name Age of Onset    No Known Problems Mother       No Known Allergies  Current Outpatient Medications   Medication Instructions    albuterol 90 mcg/actuation inhaler 2 puffs, inhalation, Every 4 hours PRN    ferrous gluconate 324 (38 Fe) mg tablet Take 1 tablet with a meal 3 days per week    fluconazole (Diflucan) 150 mg tablet TAKE 1 TABLET (150 MG) BY MOUTH 1 TIME FOR 1 DOSE. REPEAT IN 72 HOURS IF NEEDED.    ibuprofen 800 mg, oral, 3 times daily PRN    levonorgestrel (Mirena) 21 mcg/24 hours (8 yrs) 52 mg IUD 1 each, intrauterine, Once    prenatal vitamin, iron-folic, 27 mg iron-800 mcg folic acid tablet 1 tablet, oral, Daily    rimegepant (NURTEC) 75 mg tablet,disintegrating Take 1 tablet daily as needed for migraine relief    rizatriptan MLT (MAXALT-MLT) 5 mg, oral, Once as needed, May repeat in 2 hours if unresolved. Do not exceed 30 mg in 24 hours.    topiramate (TOPAMAX) 50 mg, oral, 2 times daily       Physical Exam  General: Alert and oriented, in no acute distress. Appears stated age, well-nourished, and well hydrated  HEENT:  - Head: Normocephalic and atraumatic   - Eyes: EOMI, PERRLA  - ENT: Hearing grossly intact. Mucus membranes pink and  moist without lesions. Tonsils present without swelling or exudates. Good dentition. TMs gray  Neck: Supple. No stiffness. No thyromegaly or thyroid nodules  Heart: RRR. No murmurs, clicks, or rubs  Lungs: Unlabored breathing. CTAB with no crackles, wheezes, or rhonchi  Abdomen: Normal BS in all 4 quadrants. Soft, non-tender, non-distended, with no masses  Extremities: Warm and well perfused. No edema. Normal peripheral pulses  Musculoskeletal: ROM intact. Strength 5/5 in BUE and BLE. No joint swelling. Normal gait and station  Neurological: Alert and oriented. No gross neurological deficits. Normal sensation. No weakness. DTRs +2/4   Psychological: Appropriate mood and affect  Skin: No rash, abnormal lesions, cyanosis, or erythema      Assessment and Plan     1. Migraines  - Well controlled on topiramate, getting about 2 migraines per month  - Since she is trying to conceive, recommend weaning off topiramate  -- Instructions provided to do this  - Can take ibuprofen and rizaptriptan prn but if becomes pregnant she will need to stop  - Avoidance of known triggers    2. Asthma  - Well controlled  - Continue albuterol prn     3. 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 Desires pregnancy  - IO HCG negative (this was ran before I knew her LMP was 7/28)  - Continue prenatal vitamins    5. Prediabetes  - IO HgA1c 5.4%    5. Annual physical  - Up to date on vaccines  - Had recent STI screening  - Declined to check routine labs  - Maintain healthy lifestyle    RTC in 6 months for chronic care or sooner prn    AXEL Sheldon-CNP  [facility]

## 2024-09-05 ENCOUNTER — HOSPITAL ENCOUNTER (EMERGENCY)
Facility: HOSPITAL | Age: 29
Discharge: HOME | End: 2024-09-06
Payer: COMMERCIAL

## 2024-09-05 VITALS
HEART RATE: 95 BPM | OXYGEN SATURATION: 100 % | WEIGHT: 265 LBS | TEMPERATURE: 98 F | DIASTOLIC BLOOD PRESSURE: 78 MMHG | BODY MASS INDEX: 40.16 KG/M2 | HEIGHT: 68 IN | SYSTOLIC BLOOD PRESSURE: 141 MMHG | RESPIRATION RATE: 18 BRPM

## 2024-09-05 DIAGNOSIS — S93.401A SPRAIN OF RIGHT ANKLE, INITIAL ENCOUNTER: Primary | ICD-10-CM

## 2024-09-05 DIAGNOSIS — S69.92XA INJURY OF FINGER OF LEFT HAND, INITIAL ENCOUNTER: ICD-10-CM

## 2024-09-05 PROCEDURE — 99284 EMERGENCY DEPT VISIT MOD MDM: CPT

## 2024-09-05 ASSESSMENT — PAIN SCALES - GENERAL: PAINLEVEL_OUTOF10: 10 - WORST POSSIBLE PAIN

## 2024-09-05 ASSESSMENT — COLUMBIA-SUICIDE SEVERITY RATING SCALE - C-SSRS
2. HAVE YOU ACTUALLY HAD ANY THOUGHTS OF KILLING YOURSELF?: NO
6. HAVE YOU EVER DONE ANYTHING, STARTED TO DO ANYTHING, OR PREPARED TO DO ANYTHING TO END YOUR LIFE?: NO
1. IN THE PAST MONTH, HAVE YOU WISHED YOU WERE DEAD OR WISHED YOU COULD GO TO SLEEP AND NOT WAKE UP?: NO

## 2024-09-05 ASSESSMENT — PAIN - FUNCTIONAL ASSESSMENT: PAIN_FUNCTIONAL_ASSESSMENT: 0-10

## 2024-09-05 NOTE — Clinical Note
Matti Womack was seen and treated in our emergency department on 9/5/2024.  She may return to work on 09/09/2024.       If you have any questions or concerns, please don't hesitate to call.      Nadiya Martel PA-C

## 2024-09-06 ENCOUNTER — APPOINTMENT (OUTPATIENT)
Dept: RADIOLOGY | Facility: HOSPITAL | Age: 29
End: 2024-09-06
Payer: COMMERCIAL

## 2024-09-06 PROCEDURE — 73610 X-RAY EXAM OF ANKLE: CPT | Mod: RIGHT SIDE | Performed by: RADIOLOGY

## 2024-09-06 PROCEDURE — 73110 X-RAY EXAM OF WRIST: CPT | Mod: LEFT SIDE | Performed by: RADIOLOGY

## 2024-09-06 PROCEDURE — 73610 X-RAY EXAM OF ANKLE: CPT | Mod: RT

## 2024-09-06 PROCEDURE — 73130 X-RAY EXAM OF HAND: CPT | Mod: LT

## 2024-09-06 PROCEDURE — 73110 X-RAY EXAM OF WRIST: CPT | Mod: LT

## 2024-09-06 PROCEDURE — 73630 X-RAY EXAM OF FOOT: CPT | Mod: RIGHT SIDE | Performed by: RADIOLOGY

## 2024-09-06 PROCEDURE — 73630 X-RAY EXAM OF FOOT: CPT | Mod: RT

## 2024-09-06 PROCEDURE — 73130 X-RAY EXAM OF HAND: CPT | Mod: LEFT SIDE | Performed by: RADIOLOGY

## 2024-09-06 RX ORDER — ACETAMINOPHEN 325 MG/1
975 TABLET ORAL ONCE
Status: COMPLETED | OUTPATIENT
Start: 2024-09-06 | End: 2024-09-06

## 2024-09-06 RX ORDER — KETOROLAC TROMETHAMINE 30 MG/ML
15 INJECTION, SOLUTION INTRAMUSCULAR; INTRAVENOUS ONCE
Status: DISCONTINUED | OUTPATIENT
Start: 2024-09-06 | End: 2024-09-06 | Stop reason: HOSPADM
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
## 2024-09-14 DIAGNOSIS — Z00.00 HEALTHCARE MAINTENANCE: ICD-10-CM

## 2024-09-16 RX ORDER — B-COMPLEX WITH VITAMIN C
1 TABLET ORAL DAILY
Qty: 30 TABLET | Refills: 0 | Status: SHIPPED | OUTPATIENT
Start: 2024-09-16

## 2024-10-07 ENCOUNTER — APPOINTMENT (OUTPATIENT)
Dept: ORTHOPEDIC SURGERY | Facility: CLINIC | Age: 29
End: 2024-10-07
Payer: COMMERCIAL

## 2024-10-21 ENCOUNTER — INITIAL PRENATAL (OUTPATIENT)
Dept: OBSTETRICS AND GYNECOLOGY | Facility: CLINIC | Age: 29
End: 2024-10-21
Payer: COMMERCIAL

## 2024-10-21 VITALS — DIASTOLIC BLOOD PRESSURE: 77 MMHG | BODY MASS INDEX: 38.01 KG/M2 | WEIGHT: 250 LBS | SYSTOLIC BLOOD PRESSURE: 124 MMHG

## 2024-10-21 DIAGNOSIS — Z32.01 PREGNANCY TEST POSITIVE (HHS-HCC): Primary | ICD-10-CM

## 2024-10-21 DIAGNOSIS — Z86.711 HISTORY OF PULMONARY EMBOLISM: ICD-10-CM

## 2024-10-21 DIAGNOSIS — Z3A.01 LESS THAN 8 WEEKS GESTATION OF PREGNANCY (HHS-HCC): ICD-10-CM

## 2024-10-21 DIAGNOSIS — J45.20 MILD INTERMITTENT ASTHMA WITHOUT COMPLICATION (HHS-HCC): ICD-10-CM

## 2024-10-21 LAB — PREGNANCY TEST URINE, POC: POSITIVE

## 2024-10-21 PROCEDURE — 99214 OFFICE O/P EST MOD 30 MIN: CPT | Performed by: OBSTETRICS & GYNECOLOGY

## 2024-10-21 PROCEDURE — H1000 PRENATAL CARE ATRISK ASSESSM: HCPCS | Performed by: OBSTETRICS & GYNECOLOGY

## 2024-10-21 PROCEDURE — 81025 URINE PREGNANCY TEST: CPT | Performed by: OBSTETRICS & GYNECOLOGY

## 2024-10-21 RX ORDER — ENOXAPARIN SODIUM 100 MG/ML
40 INJECTION SUBCUTANEOUS DAILY
Qty: 30 EACH | Refills: 11 | Status: SHIPPED | OUTPATIENT
Start: 2024-10-21 | End: 2025-10-21

## 2024-10-21 SDOH — ECONOMIC STABILITY: FOOD INSECURITY: WITHIN THE PAST 12 MONTHS, YOU WORRIED THAT YOUR FOOD WOULD RUN OUT BEFORE YOU GOT MONEY TO BUY MORE.: NEVER TRUE

## 2024-10-21 SDOH — HEALTH STABILITY: PHYSICAL HEALTH: ON AVERAGE, HOW MANY MINUTES DO YOU ENGAGE IN EXERCISE AT THIS LEVEL?: 0 MIN

## 2024-10-21 SDOH — ECONOMIC STABILITY: FOOD INSECURITY: WITHIN THE PAST 12 MONTHS, THE FOOD YOU BOUGHT JUST DIDN'T LAST AND YOU DIDN'T HAVE MONEY TO GET MORE.: NEVER TRUE

## 2024-10-21 SDOH — HEALTH STABILITY: PHYSICAL HEALTH: ON AVERAGE, HOW MANY DAYS PER WEEK DO YOU ENGAGE IN MODERATE TO STRENUOUS EXERCISE (LIKE A BRISK WALK)?: 0 DAYS

## 2024-10-21 ASSESSMENT — EDINBURGH POSTNATAL DEPRESSION SCALE (EPDS)
I HAVE BEEN ABLE TO LAUGH AND SEE THE FUNNY SIDE OF THINGS: AS MUCH AS I ALWAYS COULD
TOTAL SCORE: 6
I HAVE BEEN ANXIOUS OR WORRIED FOR NO GOOD REASON: HARDLY EVER
I HAVE FELT SAD OR MISERABLE: NOT VERY OFTEN
THINGS HAVE BEEN GETTING ON TOP OF ME: NO, MOST OF THE TIME I HAVE COPED QUITE WELL
THE THOUGHT OF HARMING MYSELF HAS OCCURRED TO ME: NEVER
I HAVE BEEN SO UNHAPPY THAT I HAVE BEEN CRYING: NO, NEVER
I HAVE LOOKED FORWARD WITH ENJOYMENT TO THINGS: AS MUCH AS I EVER DID
I HAVE LOOKED FORWARD WITH ENJOYMENT TO THINGS: AS MUCH AS I EVER DID
I HAVE BEEN SO UNHAPPY THAT I HAVE HAD DIFFICULTY SLEEPING: NOT VERY OFTEN
I HAVE BEEN ANXIOUS OR WORRIED FOR NO GOOD REASON: HARDLY EVER
I HAVE BEEN ABLE TO LAUGH AND SEE THE FUNNY SIDE OF THINGS: AS MUCH AS I ALWAYS COULD
THE THOUGHT OF HARMING MYSELF HAS OCCURRED TO ME: NEVER
I HAVE BEEN SO UNHAPPY THAT I HAVE HAD DIFFICULTY SLEEPING: NOT VERY OFTEN
I HAVE FELT SCARED OR PANICKY FOR NO GOOD REASON: NO, NOT MUCH
THINGS HAVE BEEN GETTING ON TOP OF ME: NO, MOST OF THE TIME I HAVE COPED QUITE WELL
TOTAL SCORE: 6
I HAVE FELT SCARED OR PANICKY FOR NO GOOD REASON: NO, NOT MUCH
I HAVE BLAMED MYSELF UNNECESSARILY WHEN THINGS WENT WRONG: NOT VERY OFTEN
I HAVE BLAMED MYSELF UNNECESSARILY WHEN THINGS WENT WRONG: NOT VERY OFTEN
I HAVE BEEN SO UNHAPPY THAT I HAVE BEEN CRYING: NO, NEVER
I HAVE FELT SAD OR MISERABLE: NOT VERY OFTEN

## 2024-10-21 ASSESSMENT — ENCOUNTER SYMPTOMS
NEUROLOGICAL NEGATIVE: 0
CARDIOVASCULAR NEGATIVE: 0
PSYCHIATRIC NEGATIVE: 0
EYES NEGATIVE: 0
MUSCULOSKELETAL NEGATIVE: 0
CONSTITUTIONAL NEGATIVE: 0
ENDOCRINE NEGATIVE: 0
ALLERGIC/IMMUNOLOGIC NEGATIVE: 0
HEMATOLOGIC/LYMPHATIC NEGATIVE: 0
RESPIRATORY NEGATIVE: 0
GASTROINTESTINAL NEGATIVE: 0

## 2024-10-21 NOTE — PROGRESS NOTES
Subjective   Patient ID [id number]   Matti Womack is a 29 y.o.  at 7w5d with a working estimated date of delivery of 2025, by Last Menstrual Period who presents for an initial prenatal visit. This pregnancy is planned.    Her pregnancy is complicated by:  History of pulmonary embolism  Asthma    OB History    Para Term  AB Living   5 4 4     4   SAB IAB Ectopic Multiple Live Births           4      # Outcome Date GA Lbr Veens/2nd Weight Sex Type Anes PTL Lv   5 Current            4 Term 20    M Vag-Spont EPI  JESSY   3 Term 19    F Vag-Spont EPI  JESSY   2 Term 17    F Vag-Spont EPI  JESSY   1 Term 14    F Vag-Spont EPI  JESSY     [place]  Depression Scale Total: 6    Objective   Physical Exam  Weight: 113 kg (250 lb)  Expected Total Weight Gain: Could not be calculated   Pregravid BMI: Could not be calculated  BP: 124/77      OBGyn Exam  See initial OB    Prenatal Labs  Prenatal labs ordered  Box given for prequel    Assessment/Plan   Diagnoses and all orders for this visit:  Less than 8 weeks gestation of pregnancy ([facility])  Pregnancy test positive ([facility])  History of pulmonary embolism  Mild intermittent asthma without complication ([facility])    Patient with history of pulmonary embolism, not on medication.  Will start Lovenox 40 mg subcu daily  Prenatal Labs ordered  Daily prenatal vitamins prescribed  First trimester screening and second trimester screening discussed. Patient decided to screening ordered  Follow up in 2 weeks for return OB visit.

## 2024-10-28 ENCOUNTER — APPOINTMENT (OUTPATIENT)
Dept: ORTHOPEDIC SURGERY | Facility: CLINIC | Age: 29
End: 2024-10-28
Payer: COMMERCIAL

## 2024-10-28 DIAGNOSIS — M79.671 RIGHT FOOT PAIN: Primary | ICD-10-CM

## 2024-10-28 DIAGNOSIS — M76.821 POSTERIOR TIBIAL TENDINITIS OF RIGHT LOWER EXTREMITY: ICD-10-CM

## 2024-10-28 PROCEDURE — 99203 OFFICE O/P NEW LOW 30 MIN: CPT | Performed by: ORTHOPAEDIC SURGERY

## 2024-10-28 PROCEDURE — 1036F TOBACCO NON-USER: CPT | Performed by: ORTHOPAEDIC SURGERY

## 2024-10-28 PROCEDURE — 99213 OFFICE O/P EST LOW 20 MIN: CPT | Performed by: ORTHOPAEDIC SURGERY

## 2024-11-05 ENCOUNTER — APPOINTMENT (OUTPATIENT)
Dept: OBSTETRICS AND GYNECOLOGY | Facility: CLINIC | Age: 29
End: 2024-11-05
Payer: COMMERCIAL

## 2024-11-05 VITALS — SYSTOLIC BLOOD PRESSURE: 121 MMHG | DIASTOLIC BLOOD PRESSURE: 76 MMHG | WEIGHT: 248 LBS | BODY MASS INDEX: 37.71 KG/M2

## 2024-11-05 DIAGNOSIS — O21.9 NAUSEA AND VOMITING IN PREGNANCY: Primary | ICD-10-CM

## 2024-11-05 DIAGNOSIS — Z3A.09 9 WEEKS GESTATION OF PREGNANCY (HHS-HCC): ICD-10-CM

## 2024-11-05 DIAGNOSIS — J45.909 ASTHMA AFFECTING PREGNANCY IN FIRST TRIMESTER (HHS-HCC): ICD-10-CM

## 2024-11-05 DIAGNOSIS — O99.511 ASTHMA AFFECTING PREGNANCY IN FIRST TRIMESTER (HHS-HCC): ICD-10-CM

## 2024-11-05 PROBLEM — Z86.711 HISTORY OF PULMONARY EMBOLISM: Status: ACTIVE | Noted: 2018-06-14

## 2024-11-05 PROBLEM — N91.2 AMENORRHEA: Status: RESOLVED | Noted: 2023-02-08 | Resolved: 2024-11-05

## 2024-11-05 PROCEDURE — 87086 URINE CULTURE/COLONY COUNT: CPT

## 2024-11-05 PROCEDURE — 87591 N.GONORRHOEAE DNA AMP PROB: CPT

## 2024-11-05 PROCEDURE — 87661 TRICHOMONAS VAGINALIS AMPLIF: CPT

## 2024-11-05 PROCEDURE — 99213 OFFICE O/P EST LOW 20 MIN: CPT | Performed by: STUDENT IN AN ORGANIZED HEALTH CARE EDUCATION/TRAINING PROGRAM

## 2024-11-05 PROCEDURE — 87491 CHLMYD TRACH DNA AMP PROBE: CPT

## 2024-11-05 RX ORDER — PYRIDOXINE HCL (VITAMIN B6) 25 MG
25 TABLET ORAL EVERY 8 HOURS
Qty: 90 TABLET | Refills: 1 | Status: SHIPPED | OUTPATIENT
Start: 2024-11-05 | End: 2025-02-03

## 2024-11-05 ASSESSMENT — ENCOUNTER SYMPTOMS
NAUSEA: 1
LOSS OF SENSATION IN FEET: 0
DEPRESSION: 0
OCCASIONAL FEELINGS OF UNSTEADINESS: 0

## 2024-11-05 NOTE — PROGRESS NOTES
Matti Womack is a 29 y.o.  at 9w6d here for OB follow-up.      Subjective     Patient complaint is nausea with low appetite.  Denies vaginal bleeding, leakage of fluid, painful regular uterine contractions, decreased fetal movement.     Review of Systems   Gastrointestinal:  Positive for nausea.   All other systems reviewed and are negative.      OB History    Para Term  AB Living   5 4 4     4   SAB IAB Ectopic Multiple Live Births           4      # Outcome Date GA Lbr Evens/2nd Weight Sex Type Anes PTL Lv   5 Current            4 Term 20    M Vag-Spont EPI  JESSY   3 Term 19    F Vag-Spont EPI  JESSY   2 Term 17   4.196 kg F Vag-Spont EPI  JESSY   1 Term 14    F Vag-Spont EPI  JESSY          Objective   Physical Exam  Weight: 112 kg (248 lb)  Expected Total Weight Gain: 5 kg (11 lb)-9 kg (19 lb)   Pregravid BMI: 36.50  BP: 121/76  --     --                --  Physical Exam  Constitutional:       General: She is not in acute distress.     Appearance: She is not ill-appearing, toxic-appearing or diaphoretic.   Pulmonary:      Effort: Pulmonary effort is normal.   Neurological:      Mental Status: She is alert.   Psychiatric:         Mood and Affect: Mood normal.   Vitals reviewed.          ASSESSMENT & PLAN    Matti Womack is a 29 y.o.  at 9w6d here for the following concerns we addressed today:    Problem List Items Addressed This Visit       Nausea and vomiting in pregnancy - Primary    Overview     : rx b6/Doxylamine         Relevant Medications    doxylamine (Unisom) 25 mg tablet    pyridoxine (Vitamin B-6) 25 mg tablet    Asthma affecting pregnancy in first trimester ([facility])    Overview     Albuterol PRN         9 weeks gestation of pregnancy ([facility])    Overview     Desired provider in labor: [] CNM  [] Physician  [] Blood Products: [] Yes, accepts [] No, needs counseling  [x] Initial BMI: 36.50   [] Prenatal Labs:   [] Cervical Cancer Screening up to  date  [] Rh status:   [] Genetic Screening:    [] NT US: (11-13 wks)  [] Baby ASA (if indicated):  [] Pregnancy dated by:     [] Demetri US: (19-20 wks)  [] Federal Sterilization consent signed (if indicated):  [] 1hr GCT at 24-28wks:  [] Rhogam (if indicated):   [] Fetal Surveillance (if indicated):  [] Tdap (27-32 wks, may be given up to 36 wks if initial window missed):   [] RSV (32-36 wks) (Sept. to end of Jan):   [] Flu Vaccine:    [] Breastfeeding:  [] Postpartum Birth control method:   [] GBS at 36 - 37 wks:  [] 39 weeks discussion of IOL vs. Expectant management:  [] Mode of delivery ( anticipated ):          Relevant Orders    Urine Culture    Trichomonas vaginalis, Amplified    C. trachomatis / N. gonorrhoeae, Amplified         Patient here for problem visit related to nausea.  Will start patient on doxylamine and pyridoxine.    Follow up in 2 weeks as scheduled      Benson Wharton MD

## 2024-11-06 LAB
BACTERIA UR CULT: NORMAL
C TRACH RRNA SPEC QL NAA+PROBE: NEGATIVE
N GONORRHOEA DNA SPEC QL PROBE+SIG AMP: NEGATIVE
T VAGINALIS RRNA SPEC QL NAA+PROBE: NEGATIVE

## 2024-11-13 ENCOUNTER — LAB (OUTPATIENT)
Dept: LAB | Facility: LAB | Age: 29
End: 2024-11-13
Payer: COMMERCIAL

## 2024-11-13 ENCOUNTER — HOSPITAL ENCOUNTER (OUTPATIENT)
Dept: RADIOLOGY | Facility: CLINIC | Age: 29
Discharge: HOME | End: 2024-11-13
Payer: COMMERCIAL

## 2024-11-13 DIAGNOSIS — Z3A.01 LESS THAN 8 WEEKS GESTATION OF PREGNANCY (HHS-HCC): ICD-10-CM

## 2024-11-13 DIAGNOSIS — E55.9 VITAMIN D DEFICIENCY: Primary | ICD-10-CM

## 2024-11-13 LAB
25(OH)D3 SERPL-MCNC: 17 NG/ML (ref 30–100)
ABO GROUP (TYPE) IN BLOOD: NORMAL
ANTIBODY SCREEN: NORMAL
ERYTHROCYTE [DISTWIDTH] IN BLOOD BY AUTOMATED COUNT: 16.3 % (ref 11.5–14.5)
EST. AVERAGE GLUCOSE BLD GHB EST-MCNC: 120 MG/DL
HBA1C MFR BLD: 5.8 %
HBV SURFACE AG SERPL QL IA: NONREACTIVE
HCT VFR BLD AUTO: 36.5 % (ref 36–46)
HGB BLD-MCNC: 11.2 G/DL (ref 12–16)
HIV 1+2 AB+HIV1 P24 AG SERPL QL IA: NONREACTIVE
MCH RBC QN AUTO: 23.7 PG (ref 26–34)
MCHC RBC AUTO-ENTMCNC: 30.7 G/DL (ref 32–36)
MCV RBC AUTO: 77 FL (ref 80–100)
NRBC BLD-RTO: 0 /100 WBCS (ref 0–0)
PLATELET # BLD AUTO: 172 X10*3/UL (ref 150–450)
RBC # BLD AUTO: 4.73 X10*6/UL (ref 4–5.2)
REFLEX ADDED, ANEMIA PANEL: NORMAL
RH FACTOR (ANTIGEN D): NORMAL
RUBV IGG SERPL IA-ACNC: 0.8 IA
RUBV IGG SERPL QL IA: NORMAL
TREPONEMA PALLIDUM IGG+IGM AB [PRESENCE] IN SERUM OR PLASMA BY IMMUNOASSAY: NONREACTIVE
WBC # BLD AUTO: 6.3 X10*3/UL (ref 4.4–11.3)

## 2024-11-13 PROCEDURE — 83036 HEMOGLOBIN GLYCOSYLATED A1C: CPT

## 2024-11-13 PROCEDURE — 86317 IMMUNOASSAY INFECTIOUS AGENT: CPT

## 2024-11-13 PROCEDURE — 86780 TREPONEMA PALLIDUM: CPT

## 2024-11-13 PROCEDURE — 82306 VITAMIN D 25 HYDROXY: CPT

## 2024-11-13 PROCEDURE — 87340 HEPATITIS B SURFACE AG IA: CPT

## 2024-11-13 PROCEDURE — 76813 OB US NUCHAL MEAS 1 GEST: CPT | Performed by: STUDENT IN AN ORGANIZED HEALTH CARE EDUCATION/TRAINING PROGRAM

## 2024-11-13 PROCEDURE — 85027 COMPLETE CBC AUTOMATED: CPT

## 2024-11-13 PROCEDURE — 36415 COLL VENOUS BLD VENIPUNCTURE: CPT

## 2024-11-13 PROCEDURE — 86850 RBC ANTIBODY SCREEN: CPT

## 2024-11-13 PROCEDURE — 76813 OB US NUCHAL MEAS 1 GEST: CPT

## 2024-11-13 PROCEDURE — 86901 BLOOD TYPING SEROLOGIC RH(D): CPT

## 2024-11-13 PROCEDURE — 87389 HIV-1 AG W/HIV-1&-2 AB AG IA: CPT

## 2024-11-13 PROCEDURE — 86900 BLOOD TYPING SEROLOGIC ABO: CPT

## 2024-11-14 RX ORDER — VIT C/E/ZN/COPPR/LUTEIN/ZEAXAN 250MG-90MG
10000 CAPSULE ORAL
Qty: 13 CAPSULE | Refills: 3 | Status: SHIPPED | OUTPATIENT
Start: 2024-11-17 | End: 2025-11-17

## 2024-11-20 LAB
COMMENTS - MP RESULT TYPE: NORMAL
SCAN RESULT: NORMAL

## 2024-12-09 ENCOUNTER — APPOINTMENT (OUTPATIENT)
Dept: OBSTETRICS AND GYNECOLOGY | Facility: CLINIC | Age: 29
End: 2024-12-09
Payer: COMMERCIAL

## 2024-12-09 VITALS — SYSTOLIC BLOOD PRESSURE: 119 MMHG | DIASTOLIC BLOOD PRESSURE: 76 MMHG | BODY MASS INDEX: 38.92 KG/M2 | WEIGHT: 256 LBS

## 2024-12-09 DIAGNOSIS — O99.511 ASTHMA AFFECTING PREGNANCY IN FIRST TRIMESTER (HHS-HCC): ICD-10-CM

## 2024-12-09 DIAGNOSIS — J45.909 ASTHMA AFFECTING PREGNANCY IN FIRST TRIMESTER (HHS-HCC): ICD-10-CM

## 2024-12-09 DIAGNOSIS — Z86.711 HISTORY OF PULMONARY EMBOLISM: ICD-10-CM

## 2024-12-09 DIAGNOSIS — Z3A.14 14 WEEKS GESTATION OF PREGNANCY (HHS-HCC): Primary | ICD-10-CM

## 2024-12-09 PROCEDURE — 99213 OFFICE O/P EST LOW 20 MIN: CPT | Performed by: OBSTETRICS & GYNECOLOGY

## 2024-12-09 ASSESSMENT — ENCOUNTER SYMPTOMS
PSYCHIATRIC NEGATIVE: 0
CARDIOVASCULAR NEGATIVE: 0
CONSTITUTIONAL NEGATIVE: 0
EYES NEGATIVE: 0
GASTROINTESTINAL NEGATIVE: 0
HEMATOLOGIC/LYMPHATIC NEGATIVE: 0
RESPIRATORY NEGATIVE: 0
ENDOCRINE NEGATIVE: 0
MUSCULOSKELETAL NEGATIVE: 0
NEUROLOGICAL NEGATIVE: 0
ALLERGIC/IMMUNOLOGIC NEGATIVE: 0

## 2024-12-09 NOTE — PROGRESS NOTES
Ob Visit  24     SUBJECTIVE      HPI: Matti Womack is a 29 y.o.  at 14w5d here for RPNV.    Complaints.  No issues with her asthma.  Is on vitamin D for deficiency.  Continuing on Lovenox 40 mg daily anatomy ultrasound pending    OBJECTIVE  Visit Vitals  /76   Wt 116 kg (256 lb)   LMP 2024   BMI 38.92 kg/m²   OB Status Pregnant   Smoking Status Never   BSA 2.36 m²            ASSESSMENT & PLAN    Matti Womack is a 29 y.o.  at 14w5d here for the following concerns we addressed today:    Problem List Items Addressed This Visit       Asthma affecting pregnancy in first trimester ([facility])    Overview     Albuterol PRN         Current Assessment & Plan     Inhaler as needed although has not had to use it         14 weeks gestation of pregnancy ([facility]) - Primary    Overview     Desired provider in labor: [] CNM  [] Physician  [x] Blood Products: [] Yes, accepts [] No, needs counseling  [x] Initial BMI: 36.50   [x] Prenatal Labs:   [x] Cervical Cancer Screening up to date  [x] Rh status: Positive  [x] Genetic Screening:    [x] NT US: (11-13 wks)  [] Baby ASA (if indicated): On Lovenox, risks reviewed declined  [x] Pregnancy dated by: Dates and ultrasound    [] Anatomy US: (19-20 wks)  [] Federal Sterilization consent signed (if indicated):  [] 1hr GCT at 24-28wks:  [] Rhogam (if indicated):   [] Fetal Surveillance (if indicated):  [] Tdap (27-32 wks, may be given up to 36 wks if initial window missed):   [] RSV (32-36 wks) (Sept. to end of ):  [x] Flu Vaccine: Declined  [x] Breastfeeding:  [] Postpartum Birth control method:   [] GBS at 36 - 37 wks:  [] 39 weeks discussion of IOL vs. Expectant management:  [x] Mode of delivery ( anticipated ): Vaginal         History of pulmonary embolism    Overview     see epic note 18  INTMED visit s/p hospitalization at [facility] 2018 for pulmonary embolus started on xarelto and eliquis which patient self d/c'd several weeks after MFM consult  done  2/21/19 and started on Lovenox  - On lovenox            Continue vitamin D and Lovenox.  Has anatomy ultrasound scheduled.  Return to office in 3 weeks    RTC in 3 weeks      Leandro Hirsch MD

## 2024-12-30 ENCOUNTER — APPOINTMENT (OUTPATIENT)
Dept: OBSTETRICS AND GYNECOLOGY | Facility: CLINIC | Age: 29
End: 2024-12-30
Payer: COMMERCIAL

## 2024-12-30 VITALS — DIASTOLIC BLOOD PRESSURE: 77 MMHG | SYSTOLIC BLOOD PRESSURE: 110 MMHG | WEIGHT: 260 LBS | BODY MASS INDEX: 39.53 KG/M2

## 2024-12-30 DIAGNOSIS — Z3A.17 17 WEEKS GESTATION OF PREGNANCY (HHS-HCC): Primary | ICD-10-CM

## 2024-12-30 DIAGNOSIS — J45.20 MILD INTERMITTENT ASTHMA WITHOUT COMPLICATION (HHS-HCC): ICD-10-CM

## 2024-12-30 DIAGNOSIS — G40.909 SEIZURE DISORDER (MULTI): ICD-10-CM

## 2024-12-30 DIAGNOSIS — E66.9 OBESITY WITH BODY MASS INDEX 30 OR GREATER: ICD-10-CM

## 2024-12-30 DIAGNOSIS — Z86.711 HISTORY OF PULMONARY EMBOLISM: ICD-10-CM

## 2024-12-30 PROBLEM — O21.9 NAUSEA AND VOMITING IN PREGNANCY: Status: RESOLVED | Noted: 2024-11-05 | Resolved: 2024-12-30

## 2024-12-30 PROCEDURE — 99213 OFFICE O/P EST LOW 20 MIN: CPT | Performed by: OBSTETRICS & GYNECOLOGY

## 2024-12-30 ASSESSMENT — ENCOUNTER SYMPTOMS
RESPIRATORY NEGATIVE: 0
ALLERGIC/IMMUNOLOGIC NEGATIVE: 0
CARDIOVASCULAR NEGATIVE: 0
CONSTITUTIONAL NEGATIVE: 0
PSYCHIATRIC NEGATIVE: 0
EYES NEGATIVE: 0
GASTROINTESTINAL NEGATIVE: 0
NEUROLOGICAL NEGATIVE: 0
MUSCULOSKELETAL NEGATIVE: 0
ENDOCRINE NEGATIVE: 0
HEMATOLOGIC/LYMPHATIC NEGATIVE: 0

## 2024-12-30 NOTE — PROGRESS NOTES
Ob Visit  24     SUBJECTIVE      HPI: Matti Womakc is a 29 y.o.  at 17w5d here for RPNV.  Feeling a little tired.  Denies any issues with asthma.  Still using her Lovenox no breathing issues.  Denies any bleeding or loss of fluid.  Does feel some movement       OBJECTIVE  Visit Vitals  /77   Wt 118 kg (260 lb)   LMP 2024   BMI 39.53 kg/m²   OB Status Pregnant   Smoking Status Never   BSA 2.38 m²      ASSESSMENT & PLAN    Matti Womack is a 29 y.o.  at 17w5d here for the following concerns we addressed today:    Problem List Items Addressed This Visit       Obesity with body mass index 30 or greater    Seizure disorder (Multi)    History of pulmonary embolism    Overview     see epic note 18  INTMED visit s/p hospitalization at [facility] 2018 for pulmonary embolus started on xarelto and eliquis which patient self d/c'd several weeks after MFM consult done  19 and started on Lovenox  - On lovenox         Mild intermittent asthma without complication ([facility])    Overview     Albuterol PRN         17 weeks gestation of pregnancy ([facility]) - Primary    Overview     Desired provider in labor: [] CNM  [] Physician  [x] Blood Products: [] Yes, accepts [] No, needs counseling  [x] Initial BMI: 36.50   [x] Prenatal Labs:   [x] Cervical Cancer Screening up to date  [x] Rh status: Positive  [x] Genetic Screening:    [x] NT US: (11-13 wks)  [] Baby ASA (if indicated): On Lovenox, risks reviewed declined  [x] Pregnancy dated by: Dates and ultrasound    [] Anatomy US: (19-20 wks)  [] Federal Sterilization consent signed (if indicated):  [] 1hr GCT at 24-28wks:  [] Rhogam (if indicated):   [] Fetal Surveillance (if indicated):  [] Tdap (27-32 wks, may be given up to 36 wks if initial window missed):   [] RSV (32-36 wks) (Sept. to end of ):  [x] Flu Vaccine: Declined  [x] Breastfeeding:  [] Postpartum Birth control method:   [] GBS at 36 - 37 wks:  [] 39 weeks discussion of IOL vs.  Expectant management:  [x] Mode of delivery ( anticipated ): Vaginal              RTC in 3 weeks      Leandro Hirsch MD

## 2025-01-09 ENCOUNTER — HOSPITAL ENCOUNTER (OUTPATIENT)
Dept: RADIOLOGY | Facility: CLINIC | Age: 30
Discharge: HOME | End: 2025-01-09
Payer: COMMERCIAL

## 2025-01-09 DIAGNOSIS — O35.9XX0 MATERNAL CARE FOR (SUSPECTED) FETAL ABNORMALITY AND DAMAGE, UNSPECIFIED, NOT APPLICABLE OR UNSPECIFIED: ICD-10-CM

## 2025-01-09 DIAGNOSIS — Z3A.01 LESS THAN 8 WEEKS GESTATION OF PREGNANCY (HHS-HCC): ICD-10-CM

## 2025-01-09 DIAGNOSIS — O99.212 OBESITY COMPLICATING PREGNANCY, SECOND TRIMESTER (HHS-HCC): ICD-10-CM

## 2025-01-09 PROCEDURE — 76811 OB US DETAILED SNGL FETUS: CPT

## 2025-01-09 PROCEDURE — 76811 OB US DETAILED SNGL FETUS: CPT | Performed by: STUDENT IN AN ORGANIZED HEALTH CARE EDUCATION/TRAINING PROGRAM

## 2025-01-20 ENCOUNTER — APPOINTMENT (OUTPATIENT)
Dept: OBSTETRICS AND GYNECOLOGY | Facility: CLINIC | Age: 30
End: 2025-01-20
Payer: COMMERCIAL

## 2025-01-20 VITALS — WEIGHT: 262 LBS | BODY MASS INDEX: 39.84 KG/M2 | SYSTOLIC BLOOD PRESSURE: 122 MMHG | DIASTOLIC BLOOD PRESSURE: 76 MMHG

## 2025-01-20 DIAGNOSIS — Z86.711 HISTORY OF PULMONARY EMBOLISM: Primary | ICD-10-CM

## 2025-01-20 DIAGNOSIS — Z3A.20 20 WEEKS GESTATION OF PREGNANCY (HHS-HCC): ICD-10-CM

## 2025-01-20 DIAGNOSIS — G40.909 SEIZURE DISORDER (MULTI): ICD-10-CM

## 2025-01-20 DIAGNOSIS — J45.20 MILD INTERMITTENT ASTHMA WITHOUT COMPLICATION (HHS-HCC): ICD-10-CM

## 2025-01-20 PROCEDURE — 99213 OFFICE O/P EST LOW 20 MIN: CPT | Performed by: OBSTETRICS & GYNECOLOGY

## 2025-01-20 NOTE — PROGRESS NOTES
Ob Visit  25     SUBJECTIVE      HPI: Matti Womack is a 29 y.o.  at 20w5d here for RPNV.  She has no contractions, no bleeding, or no LOF. Reports active normal fetal movement. Patient reports no complaints       OBJECTIVE  Visit Vitals  /76   Wt 119 kg (262 lb)   LMP 2024   BMI 39.84 kg/m²   OB Status Pregnant   Smoking Status Never   BSA 2.39 m²            ASSESSMENT & PLAN    Matti Womack is a 29 y.o.  at 20w5d here for the following concerns we addressed today:    Problem List Items Addressed This Visit       Seizure disorder (Multi)    History of pulmonary embolism - Primary    Overview     see epic note 18  INTMED visit s/p hospitalization at [facility] 2018 for pulmonary embolus started on xarelto and eliquis which patient self d/c'd several weeks after MFM consult done  19 and started on Lovenox  - On lovenox         Mild intermittent asthma without complication ([facility])    Overview     Albuterol PRN         20 weeks gestation of pregnancy ([facility])    Overview     Desired provider in labor: [] CNM  [] Physician  [x] Blood Products: [] Yes, accepts [] No, needs counseling  [x] Initial BMI: 36.50   [x] Prenatal Labs:   [x] Cervical Cancer Screening up to date  [x] Rh status: Positive  [x] Genetic Screening:    [x] NT US: (11-13 wks)  [] Baby ASA (if indicated): On Lovenox, risks reviewed declined  [x] Pregnancy dated by: Dates and ultrasound    [x] Anatomy US: (19-20 wks)  [] Federal Sterilization consent signed (if indicated):  [] 1hr GCT at 24-28wks:  [] Rhogam (if indicated):   [] Fetal Surveillance (if indicated):  [] Tdap (27-32 wks, may be given up to 36 wks if initial window missed):   [] RSV (32-36 wks) (Sept. to end of ):  [x] Flu Vaccine: Declined  [x] Breastfeeding:  [] Postpartum Birth control method:   [] GBS at 36 - 37 wks:  [x] 39 weeks discussion of IOL vs. Expectant management:  [x] Mode of delivery ( anticipated ): Vaginal         Current  Assessment & Plan     Feeling well.  Lovenox shots as ordered.  Baby is active  Repeat ultrasound ordered.  Inadequate visualization on initial anatomy scan.  Return to office 3 weeks              RTC in 3 weeks      Leandro Hirsch MD

## 2025-01-20 NOTE — ASSESSMENT & PLAN NOTE
Feeling well.  Lovenox shots as ordered.  Baby is active  Repeat ultrasound ordered.  Inadequate visualization on initial anatomy scan.  Return to office 3 weeks

## 2025-01-23 ENCOUNTER — HOSPITAL ENCOUNTER (OUTPATIENT)
Dept: RADIOLOGY | Facility: CLINIC | Age: 30
Discharge: HOME | End: 2025-01-23
Payer: COMMERCIAL

## 2025-01-23 DIAGNOSIS — Z3A.01 LESS THAN 8 WEEKS GESTATION OF PREGNANCY (HHS-HCC): ICD-10-CM

## 2025-01-23 PROCEDURE — 76816 OB US FOLLOW-UP PER FETUS: CPT

## 2025-01-30 ENCOUNTER — HOSPITAL ENCOUNTER (OUTPATIENT)
Facility: HOSPITAL | Age: 30
Discharge: HOME | End: 2025-01-30
Attending: OBSTETRICS & GYNECOLOGY | Admitting: OBSTETRICS & GYNECOLOGY
Payer: COMMERCIAL

## 2025-01-30 ENCOUNTER — HOSPITAL ENCOUNTER (OUTPATIENT)
Facility: HOSPITAL | Age: 30
End: 2025-01-30
Attending: OBSTETRICS & GYNECOLOGY | Admitting: OBSTETRICS & GYNECOLOGY
Payer: COMMERCIAL

## 2025-01-30 VITALS
OXYGEN SATURATION: 98 % | HEART RATE: 88 BPM | RESPIRATION RATE: 18 BRPM | DIASTOLIC BLOOD PRESSURE: 75 MMHG | TEMPERATURE: 97.2 F | SYSTOLIC BLOOD PRESSURE: 129 MMHG

## 2025-01-30 PROBLEM — Z3A.22 22 WEEKS GESTATION OF PREGNANCY (HHS-HCC): Status: ACTIVE | Noted: 2024-11-05

## 2025-01-30 PROBLEM — N94.9 PAIN OF ROUND LIGAMENT: Status: ACTIVE | Noted: 2025-01-30

## 2025-01-30 LAB
POC APPEARANCE, URINE: ABNORMAL
POC BILIRUBIN, URINE: NEGATIVE
POC BLOOD, URINE: NEGATIVE
POC COLOR, URINE: ABNORMAL
POC GLUCOSE, URINE: NEGATIVE MG/DL
POC KETONES, URINE: ABNORMAL MG/DL
POC LEUKOCYTES, URINE: ABNORMAL
POC NITRITE,URINE: NEGATIVE
POC PH, URINE: 5 PH
POC PROTEIN, URINE: ABNORMAL MG/DL
POC SPECIFIC GRAVITY, URINE: 1.02
POC UROBILINOGEN, URINE: 0.2 EU/DL

## 2025-01-30 PROCEDURE — 99214 OFFICE O/P EST MOD 30 MIN: CPT | Performed by: OBSTETRICS & GYNECOLOGY

## 2025-01-30 PROCEDURE — 59025 FETAL NON-STRESS TEST: CPT | Performed by: OBSTETRICS & GYNECOLOGY

## 2025-01-30 PROCEDURE — 2500000001 HC RX 250 WO HCPCS SELF ADMINISTERED DRUGS (ALT 637 FOR MEDICARE OP): Performed by: OBSTETRICS & GYNECOLOGY

## 2025-01-30 RX ORDER — ACETAMINOPHEN 325 MG/1
650 TABLET ORAL EVERY 6 HOURS PRN
Status: DISCONTINUED | OUTPATIENT
Start: 2025-01-31 | End: 2025-01-30 | Stop reason: HOSPADM

## 2025-01-30 RX ORDER — ACETAMINOPHEN 325 MG/1
975 TABLET ORAL ONCE
Status: COMPLETED | OUTPATIENT
Start: 2025-01-30 | End: 2025-01-30

## 2025-01-30 RX ORDER — CYCLOBENZAPRINE HCL 5 MG
10 TABLET ORAL ONCE
Status: COMPLETED | OUTPATIENT
Start: 2025-01-30 | End: 2025-01-30

## 2025-01-30 RX ADMIN — CYCLOBENZAPRINE HYDROCHLORIDE 10 MG: 5 TABLET, FILM COATED ORAL at 20:52

## 2025-01-30 RX ADMIN — ACETAMINOPHEN 975 MG: 325 TABLET ORAL at 20:52

## 2025-01-30 ASSESSMENT — PAIN SCALES - GENERAL
PAINLEVEL_OUTOF10: 8
PAINLEVEL_OUTOF10: 0 - NO PAIN

## 2025-01-31 NOTE — H&P
OB Triage H&P    Assessment/Plan    Matti Womack is a 29 y.o.  at 22w1d, TABBY: 2025, by Last Menstrual Period, who presents to triage with complaints of decreased fetal movement today.  And lower pelvic pain bilateral inguinal radiating to back.  Current tracing is reactive for 22-week pregnancy.  Pain appears to probably be round ligament urinalysis is negative abdomen is soft.  No no nausea vomiting fever chills no GI or  complaints.  Patient has been having round ligament pain and has been attempting to obtain of maternity belt.  Will treat with Tylenol and Flexeril and observe.  Push fluids for the occasional irritability noted.  Will reassess.    Patient has a history of pulmonary embolism and is currently on Lovenox daily 40 mg subcu    Nonstress test reactive for 22-week pregnancy    Patient reassessed.  Currently is not having any pain at this point.  Medications worked well.  Uterine irritability is also resolved.  Patient may be discharged.  Will be sent home on Tylenol for now.  Will call the office in the morning if she starts having issues again to be seen.    Plan Flexeril and Tylenol reassess  -Fetal monitoring reassuring  -Good fetal movement  -Up to date on prenatal care  -Continue routine prenatal care    Dispo  -Patient appropriate for discharge home, agrees with plan  -Return precautions discussed   -Follow up at next scheduled OB appointment or to triage sooner as needed    Discussed plan and reviewed with: Patient    Pregnancy Problems (from 10/21/24 to present)       Problem Noted Diagnosed Resolved    20 weeks gestation of pregnancy ([facility]) 2024 by Benson Wharton MD  No    Priority:  Medium       Overview Addendum 2025 10:09 AM by Leandro Hirsch MD     Desired provider in labor: [] CNM  [] Physician  [x] Blood Products: [] Yes, accepts [] No, needs counseling  [x] Initial BMI: 36.50   [x] Prenatal Labs:   [x] Cervical Cancer Screening up to date  [x] Rh  status: Positive  [x] Genetic Screening:    [x] NT US: (11-13 wks)  [] Baby ASA (if indicated): On Lovenox, risks reviewed declined  [x] Pregnancy dated by: Dates and ultrasound    [x] Anatomy US: (19-20 wks)  [] Federal Sterilization consent signed (if indicated):  [] 1hr GCT at 24-28wks:  [] Rhogam (if indicated):   [] Fetal Surveillance (if indicated):  [] Tdap (27-32 wks, may be given up to 36 wks if initial window missed):   [] RSV (32-36 wks) (Sept. to end of ):  [x] Flu Vaccine: Declined  [x] Breastfeeding:  [] Postpartum Birth control method:   [] GBS at 36 - 37 wks:  [x] 39 weeks discussion of IOL vs. Expectant management:  [x] Mode of delivery ( anticipated ): Vaginal         Mild intermittent asthma without complication ([facility]) 2019 by Lucina Ward, APRN-CNP  No    Priority:  Medium       Overview Addendum 2024 11:18 AM by Benson Wharton MD     Albuterol PRN         Nausea and vomiting in pregnancy 2024 by Benson Wharton MD  2024 by Leandro Hirsch MD    Priority:  Medium       Overview Signed 2024 11:17 AM by Benson Wharton MD     : rx b6/Doxylamine                 Subjective   Decreased fetal movement.  Denies vaginal bleeding., Denies contractions., Denies leaking of fluid.      Prenatal Provider Dr Hirsch    OB History    Para Term  AB Living   5 4 4 0 0 4   SAB IAB Ectopic Multiple Live Births   0 0 0 0 4      # Outcome Date GA Lbr Evens/2nd Weight Sex Type Anes PTL Lv   5 Current            4 Term 20    M Vag-Spont EPI  JESSY   3 Term 19    F Vag-Spont EPI  JESSY   2 Term 17   4.196 kg F Vag-Spont EPI  JESSY   1 Term 14    F Vag-Spont EPI  JESSY       History reviewed. No pertinent surgical history.    Social History     Tobacco Use    Smoking status: Never    Smokeless tobacco: Never   Substance Use Topics    Alcohol use: Not Currently     Comment: occasional       No Known Allergies    Medications Prior to Admission    Medication Sig Dispense Refill Last Dose/Taking    albuterol 90 mcg/actuation inhaler Inhale 2 puffs every 4 hours if needed for wheezing or shortness of breath. (Patient not taking: Reported on 1/20/2025) 18 g 1     cholecalciferol, vitamin D3, 250 mcg (10,000 unit) capsule Take 1 capsule (250 mcg) by mouth 1 (one) time per week. 13 capsule 3     doxylamine (Unisom) 25 mg tablet Take 1 tablet (25 mg) by mouth once daily at bedtime. (Patient not taking: Reported on 1/20/2025) 60 tablet 0     enoxaparin (Lovenox) 40 mg/0.4 mL syringe Inject 0.4 mL (40 mg) under the skin once daily. 30 each 11     ferrous gluconate 324 (38 Fe) mg tablet Take 1 tablet with a meal 3 days per week (Patient not taking: Reported on 1/20/2025) 84 tablet 1     Prenatal Vitamin 27 mg iron- 0.8 mg tablet TAKE 1 TABLET BY MOUTH EVERY DAY 30 tablet 0     pyridoxine (Vitamin B-6) 25 mg tablet Take 1 tablet (25 mg) by mouth every 8 hours. 90 tablet 1      Objective     Last Vitals  Temp Pulse Resp BP MAP O2 Sat     88   129/75 96       Blood Pressures         1/30/2025 2032             BP: 129/75             Physical Exam  General: NAD, mood appropriate  Cardiopulmonary: warm and well perfused, breathing comfortably on room air  Abdomen: Gravid, non-tender  Extremities: Symmetric  Speculum Exam: deferred  Cervix:   /  /       Fetal Monitoring/NST  Baseline: 150 bpm, Variability: moderate,  Accelerations: present and Decelerations: none  Uterine Activity: Irregular contractions  Interpretation: Reactive    Bedside ultrasound: No    Labs in chart were reviewed.          Prenatal labs reviewed, not remarkable.

## 2025-01-31 NOTE — DISCHARGE INSTRUCTIONS
Call for any further contractions  Call for any bleeding leakage of fluid  Call if pain is not controlled with home medication  Discussed fetal movement at 22 weeks not consistent but will track  Tylenol for round ligament pain.  Consider physical therapy or maternal belt

## 2025-02-10 ENCOUNTER — APPOINTMENT (OUTPATIENT)
Dept: OBSTETRICS AND GYNECOLOGY | Facility: CLINIC | Age: 30
End: 2025-02-10
Payer: COMMERCIAL

## 2025-02-10 VITALS — BODY MASS INDEX: 38.62 KG/M2 | DIASTOLIC BLOOD PRESSURE: 70 MMHG | SYSTOLIC BLOOD PRESSURE: 110 MMHG | WEIGHT: 254 LBS

## 2025-02-10 DIAGNOSIS — N94.9 PAIN OF ROUND LIGAMENT: ICD-10-CM

## 2025-02-10 DIAGNOSIS — Z86.711 HISTORY OF PULMONARY EMBOLISM: ICD-10-CM

## 2025-02-10 DIAGNOSIS — Z3A.23 23 WEEKS GESTATION OF PREGNANCY (HHS-HCC): Primary | ICD-10-CM

## 2025-02-10 PROCEDURE — 99213 OFFICE O/P EST LOW 20 MIN: CPT | Performed by: OBSTETRICS & GYNECOLOGY

## 2025-02-10 RX ORDER — ACETAMINOPHEN 500 MG
1000 TABLET ORAL EVERY 6 HOURS PRN
Qty: 30 TABLET | Refills: 0 | Status: SHIPPED | OUTPATIENT
Start: 2025-02-10 | End: 2025-02-20

## 2025-02-10 NOTE — PROGRESS NOTES
Ob Visit  02/10/25     SUBJECTIVE      HPI: Matti Womack is a 29 y.o.  at 23w5d here for RPNV.  She has no contractions, no bleeding, or no LOF. Reports active normal fetal movement. Patient reports still some issues with some round ligament pain.  Treating with Tylenol       OBJECTIVE  Visit Vitals  /70   Wt 115 kg (254 lb)   LMP 2024   BMI 38.62 kg/m²   OB Status Pregnant   Smoking Status Never   BSA 2.35 m²            ASSESSMENT & PLAN    Matti Womack is a 29 y.o.  at 23w5d here for the following concerns we addressed today:    Problem List Items Addressed This Visit       History of pulmonary embolism - Primary    Overview     see epic note 18  INTMED visit s/p hospitalization at [facility] 2018 for pulmonary embolus started on xarelto and eliquis which patient self d/c'd several weeks after MFM consult done  19 and started on Lovenox  - On lovenox         23 weeks gestation of pregnancy ([facility])    Overview     Desired provider in labor: [] CNM  [] Physician  [x] Blood Products: [] Yes, accepts [] No, needs counseling  [x] Initial BMI: 36.50   [x] Prenatal Labs:   [x] Cervical Cancer Screening up to date  [x] Rh status: Positive  [x] Genetic Screening:    [x] NT US: (11-13 wks)  [] Baby ASA (if indicated): On Lovenox, risks reviewed declined  [x] Pregnancy dated by: Dates and ultrasound    [x] Anatomy US: (19-20 wks)  [] Federal Sterilization consent signed (if indicated):  [] 1hr GCT at 24-28wks:  [] Rhogam (if indicated):   [] Fetal Surveillance (if indicated):  [] Tdap (27-32 wks, may be given up to 36 wks if initial window missed):   [] RSV (32-36 wks) (Sept. to end of ):  [x] Flu Vaccine: Declined  [x] Breastfeeding:  [] Postpartum Birth control method:   [] GBS at 36 - 37 wks:  [x] 39 weeks discussion of IOL vs. Expectant management:  [x] Mode of delivery ( anticipated ): Vaginal         Current Assessment & Plan     Patient taking her Lovenox daily.  Like Tylenol  for the round ligament pain.  Baby is kicking.  Ultrasound is scheduled for size and growth  Return to office in 3 weeks         Pain of round ligament    Current Assessment & Plan     Treat with Tylenol for now.  Seems to be doing well              RTC in 3 weeks      Leandro Hirsch MD

## 2025-02-10 NOTE — ASSESSMENT & PLAN NOTE
Patient taking her Lovenox daily.  Like Tylenol for the round ligament pain.  Baby is kicking.  Ultrasound is scheduled for size and growth  Return to office in 3 weeks

## 2025-03-10 ENCOUNTER — APPOINTMENT (OUTPATIENT)
Dept: OBSTETRICS AND GYNECOLOGY | Facility: CLINIC | Age: 30
End: 2025-03-10
Payer: COMMERCIAL

## 2025-03-17 ENCOUNTER — APPOINTMENT (OUTPATIENT)
Dept: OBSTETRICS AND GYNECOLOGY | Facility: CLINIC | Age: 30
End: 2025-03-17
Payer: COMMERCIAL

## 2025-03-17 VITALS — BODY MASS INDEX: 40.45 KG/M2 | WEIGHT: 266 LBS | SYSTOLIC BLOOD PRESSURE: 120 MMHG | DIASTOLIC BLOOD PRESSURE: 76 MMHG

## 2025-03-17 DIAGNOSIS — Z3A.28 28 WEEKS GESTATION OF PREGNANCY (HHS-HCC): Primary | ICD-10-CM

## 2025-03-17 DIAGNOSIS — Z86.711 HISTORY OF PULMONARY EMBOLISM: ICD-10-CM

## 2025-03-17 DIAGNOSIS — G40.909 SEIZURE DISORDER (MULTI): ICD-10-CM

## 2025-03-17 PROCEDURE — 99213 OFFICE O/P EST LOW 20 MIN: CPT | Performed by: OBSTETRICS & GYNECOLOGY

## 2025-03-17 ASSESSMENT — EDINBURGH POSTNATAL DEPRESSION SCALE (EPDS)
I HAVE BEEN ANXIOUS OR WORRIED FOR NO GOOD REASON: HARDLY EVER
I HAVE FELT SCARED OR PANICKY FOR NO GOOD REASON: NO, NOT MUCH
THE THOUGHT OF HARMING MYSELF HAS OCCURRED TO ME: NEVER
I HAVE BEEN SO UNHAPPY THAT I HAVE HAD DIFFICULTY SLEEPING: NOT AT ALL
I HAVE FELT SAD OR MISERABLE: NO, NOT AT ALL
THINGS HAVE BEEN GETTING ON TOP OF ME: NO, MOST OF THE TIME I HAVE COPED QUITE WELL
I HAVE BEEN ABLE TO LAUGH AND SEE THE FUNNY SIDE OF THINGS: AS MUCH AS I ALWAYS COULD
TOTAL SCORE: 4
I HAVE BLAMED MYSELF UNNECESSARILY WHEN THINGS WENT WRONG: NOT VERY OFTEN
I HAVE BEEN SO UNHAPPY THAT I HAVE BEEN CRYING: NO, NEVER
I HAVE LOOKED FORWARD WITH ENJOYMENT TO THINGS: AS MUCH AS I EVER DID

## 2025-03-17 NOTE — PROGRESS NOTES
Ob Visit  25     SUBJECTIVE      HPI: Matti Womack is a 29 y.o.  at 28w5d here for RPNV.  She has no contractions, no bleeding, or no LOF. Reports active normal fetal movement. Patient reports no issues       OBJECTIVE  Visit Vitals  /76   Wt 121 kg (266 lb)   LMP 2024   BMI 40.45 kg/m²   OB Status Pregnant   Smoking Status Never   BSA 2.41 m²            ASSESSMENT & PLAN    Matti Womack is a 29 y.o.  at 28w5d here for the following concerns we addressed today:    Problem List Items Addressed This Visit       Seizure disorder (Multi)    History of pulmonary embolism    Overview     see epic note 18  INTMED visit s/p hospitalization at [facility] 2018 for pulmonary embolus started on xarelto and eliquis which patient self d/c'd several weeks after MFM consult done  19 and started on Lovenox  - On lovenox         28 weeks gestation of pregnancy ([facility]) - Primary    Overview     Desired provider in labor: [] CNM  [] Physician  [x] Blood Products: [] Yes, accepts [] No, needs counseling  [x] Initial BMI: 36.50   [x] Prenatal Labs:   [x] Cervical Cancer Screening up to date  [x] Rh status: Positive  [x] Genetic Screening:    [x] NT US: (11-13 wks)  [] Baby ASA (if indicated): On Lovenox, risks reviewed declined  [x] Pregnancy dated by: Dates and ultrasound    [x] Anatomy US: (19-20 wks)  [] Federal Sterilization consent signed (if indicated):  [] 1hr GCT at 24-28wks:  [] Rhogam (if indicated):   [] Fetal Surveillance (if indicated):  [] Tdap (27-32 wks, may be given up to 36 wks if initial window missed):   [] RSV (32-36 wks) (Sept. to end of ):  [x] Flu Vaccine: Declined  [x] Breastfeeding:  [] Postpartum Birth control method:   [] GBS at 36 - 37 wks:  [x] 39 weeks discussion of IOL vs. Expectant management:  [x] Mode of delivery ( anticipated ): Vaginal         Current Assessment & Plan     Patient has no complaints.  Baby is very active  .  Patient is taking her Lovenox  as directed  28-week labs ordered  Ultrasound follow-up ordered  Return to office in 2 weeks         Relevant Orders    CBC Anemia Panel With Reflex,Pregnancy    Glucose, 1 Hour Screen, Pregnancy    Syphilis Screen with Reflex    Follow Up In Obstetrics       Continue Lovenox 40 mg daily  RTC in 2 weeks      Leandro Hirsch MD

## 2025-03-17 NOTE — ASSESSMENT & PLAN NOTE
Patient has no complaints.  Baby is very active  .  Patient is taking her Lovenox as directed  28-week labs ordered  Ultrasound follow-up ordered  Return to office in 2 weeks

## 2025-03-19 ENCOUNTER — LAB (OUTPATIENT)
Dept: LAB | Facility: HOSPITAL | Age: 30
End: 2025-03-19
Payer: COMMERCIAL

## 2025-03-19 LAB
ERYTHROCYTE [DISTWIDTH] IN BLOOD BY AUTOMATED COUNT: 15.8 % (ref 11.5–14.5)
HCT VFR BLD AUTO: 33.9 % (ref 36–46)
HGB BLD-MCNC: 11 G/DL (ref 12–16)
MCH RBC QN AUTO: 25.6 PG (ref 26–34)
MCHC RBC AUTO-ENTMCNC: 32.4 G/DL (ref 32–36)
MCV RBC AUTO: 79 FL (ref 80–100)
NRBC BLD-RTO: 0 /100 WBCS (ref 0–0)
PLATELET # BLD AUTO: 162 X10*3/UL (ref 150–450)
RBC # BLD AUTO: 4.29 X10*6/UL (ref 4–5.2)
REFLEX ADDED, ANEMIA PANEL: NORMAL
WBC # BLD AUTO: 7.2 X10*3/UL (ref 4.4–11.3)

## 2025-03-19 PROCEDURE — 85027 COMPLETE CBC AUTOMATED: CPT

## 2025-03-20 DIAGNOSIS — R73.09 ABNORMAL GLUCOSE LEVEL: Primary | ICD-10-CM

## 2025-03-23 LAB
GLUCOSE 1H P 50 G GLC PO SERPL-MCNC: 174 MG/DL
T PALLIDUM AB SER QL IA: NEGATIVE

## 2025-03-26 ENCOUNTER — HOSPITAL ENCOUNTER (OUTPATIENT)
Dept: RADIOLOGY | Facility: CLINIC | Age: 30
Discharge: HOME | End: 2025-03-26
Payer: COMMERCIAL

## 2025-03-26 DIAGNOSIS — Z3A.01 LESS THAN 8 WEEKS GESTATION OF PREGNANCY (HHS-HCC): ICD-10-CM

## 2025-03-26 LAB
GLUCOSE 1H P CHAL SERPL-MCNC: 155 MG/DL
GLUCOSE 2H P CHAL SERPL-MCNC: 89 MG/DL
GLUCOSE 3H P 100 G GLC PO SERPL-MCNC: 83 MG/DL
GLUCOSE P FAST SERPL-MCNC: 80 MG/DL (ref 65–94)
SERVICE CMNT-IMP: NORMAL

## 2025-03-26 PROCEDURE — 76816 OB US FOLLOW-UP PER FETUS: CPT

## 2025-03-26 PROCEDURE — 76819 FETAL BIOPHYS PROFIL W/O NST: CPT | Performed by: OBSTETRICS & GYNECOLOGY

## 2025-03-26 PROCEDURE — 76819 FETAL BIOPHYS PROFIL W/O NST: CPT

## 2025-03-26 PROCEDURE — 76816 OB US FOLLOW-UP PER FETUS: CPT | Performed by: OBSTETRICS & GYNECOLOGY

## 2025-03-31 ENCOUNTER — APPOINTMENT (OUTPATIENT)
Dept: OBSTETRICS AND GYNECOLOGY | Facility: CLINIC | Age: 30
End: 2025-03-31
Payer: COMMERCIAL

## 2025-03-31 VITALS — DIASTOLIC BLOOD PRESSURE: 78 MMHG | BODY MASS INDEX: 41.21 KG/M2 | WEIGHT: 271 LBS | SYSTOLIC BLOOD PRESSURE: 121 MMHG

## 2025-03-31 DIAGNOSIS — G40.909 SEIZURE DISORDER (MULTI): ICD-10-CM

## 2025-03-31 DIAGNOSIS — E66.9 OBESITY WITH BODY MASS INDEX 30 OR GREATER: Primary | ICD-10-CM

## 2025-03-31 DIAGNOSIS — Z3A.30 30 WEEKS GESTATION OF PREGNANCY (HHS-HCC): ICD-10-CM

## 2025-03-31 DIAGNOSIS — Z86.711 HISTORY OF PULMONARY EMBOLISM: ICD-10-CM

## 2025-03-31 PROCEDURE — 99213 OFFICE O/P EST LOW 20 MIN: CPT | Performed by: OBSTETRICS & GYNECOLOGY

## 2025-03-31 NOTE — ASSESSMENT & PLAN NOTE
Patient feels well baby is active  On Lovenox due to history of pulmonary embolism  3-hour glucose tolerance test was normal after abnormal 1 hour.  Has ultrasound scheduled for 36 weeks  Return to office in 2 weeks

## 2025-03-31 NOTE — PROGRESS NOTES
Ob Visit  25     SUBJECTIVE      HPI: Matti Womack is a 29 y.o.  at 30w5d here for RPNV.  She has no contractions, no bleeding, or no LOF. Reports active normal fetal movement. Patient reports no issues       OBJECTIVE  Visit Vitals  /78   Wt 123 kg (271 lb)   LMP 2024   BMI 41.21 kg/m²   OB Status Pregnant   Smoking Status Never   BSA 2.43 m²            ASSESSMENT & PLAN    Matti Womack is a 29 y.o.  at 30w5d here for the following concerns we addressed today:    Problem List Items Addressed This Visit       Obesity with body mass index 30 or greater - Primary    Seizure disorder (Multi)    History of pulmonary embolism    Overview     see epic note 18  INTMED visit s/p hospitalization at [facility] 2018 for pulmonary embolus started on xarelto and eliquis which patient self d/c'd several weeks after MFM consult done  19 and started on Lovenox  - On lovenox         30 weeks gestation of pregnancy ([facility])    Overview     Desired provider in labor: [] CNM  [] Physician  [x] Blood Products: [] Yes, accepts [] No, needs counseling  [x] Initial BMI: 36.50   [x] Prenatal Labs:   [x] Cervical Cancer Screening up to date  [x] Rh status: Positive  [x] Genetic Screening:    [x] NT US: (11-13 wks)  [] Baby ASA (if indicated): On Lovenox, risks reviewed declined  [x] Pregnancy dated by: Dates and ultrasound    [x] Anatomy US: (19-20 wks)  [] Federal Sterilization consent signed (if indicated):  [] 1hr GCT at 24-28wks:  [] Rhogam (if indicated):   [] Fetal Surveillance (if indicated):  [] Tdap (27-32 wks, may be given up to 36 wks if initial window missed):   [] RSV (32-36 wks) (Sept. to end of ):  [x] Flu Vaccine: Declined  [x] Breastfeeding:  [] Postpartum Birth control method:   [] GBS at 36 - 37 wks:  [x] 39 weeks discussion of IOL vs. Expectant management:  [x] Mode of delivery ( anticipated ): Vaginal         Current Assessment & Plan     Patient feels well baby is  active  On Lovenox due to history of pulmonary embolism  3-hour glucose tolerance test was normal after abnormal 1 hour.  Has ultrasound scheduled for 36 weeks  Return to office in 2 weeks              RTC in 2 weeks      Leandro Hirsch MD

## 2025-04-14 ENCOUNTER — HOSPITAL ENCOUNTER (OUTPATIENT)
Facility: HOSPITAL | Age: 30
Discharge: HOME | End: 2025-04-14
Attending: OBSTETRICS & GYNECOLOGY | Admitting: OBSTETRICS & GYNECOLOGY
Payer: COMMERCIAL

## 2025-04-14 VITALS
WEIGHT: 275.57 LBS | HEIGHT: 68 IN | RESPIRATION RATE: 18 BRPM | BODY MASS INDEX: 41.77 KG/M2 | HEART RATE: 117 BPM | SYSTOLIC BLOOD PRESSURE: 115 MMHG | OXYGEN SATURATION: 98 % | TEMPERATURE: 96.8 F | DIASTOLIC BLOOD PRESSURE: 68 MMHG

## 2025-04-14 DIAGNOSIS — Z86.711 HISTORY OF PULMONARY EMBOLISM: ICD-10-CM
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
## 2025-04-14 PROCEDURE — 2500000001 HC RX 250 WO HCPCS SELF ADMINISTERED DRUGS (ALT 637 FOR MEDICARE OP): Performed by: OBSTETRICS & GYNECOLOGY

## 2025-04-14 PROCEDURE — 99215 OFFICE O/P EST HI 40 MIN: CPT

## 2025-04-14 PROCEDURE — 59025 FETAL NON-STRESS TEST: CPT | Performed by: OBSTETRICS & GYNECOLOGY

## 2025-04-14 PROCEDURE — 99213 OFFICE O/P EST LOW 20 MIN: CPT | Performed by: OBSTETRICS & GYNECOLOGY

## 2025-04-14 RX ORDER — LIDOCAINE HYDROCHLORIDE 10 MG/ML
0.5 INJECTION, SOLUTION EPIDURAL; INFILTRATION; INTRACAUDAL; PERINEURAL ONCE AS NEEDED
Status: DISCONTINUED | OUTPATIENT
Start: 2025-04-14 | End: 2025-04-14 | Stop reason: HOSPADM

## 2025-04-14 RX ORDER — ONDANSETRON 4 MG/1
4 TABLET, FILM COATED ORAL EVERY 6 HOURS PRN
Status: DISCONTINUED | OUTPATIENT
Start: 2025-04-14 | End: 2025-04-14 | Stop reason: HOSPADM

## 2025-04-14 RX ORDER — ONDANSETRON HYDROCHLORIDE 2 MG/ML
4 INJECTION, SOLUTION INTRAVENOUS EVERY 6 HOURS PRN
Status: DISCONTINUED | OUTPATIENT
Start: 2025-04-14 | End: 2025-04-14 | Stop reason: HOSPADM

## 2025-04-14 RX ORDER — ACETAMINOPHEN 325 MG/1
975 TABLET ORAL ONCE
Status: COMPLETED | OUTPATIENT
Start: 2025-04-14 | End: 2025-04-14

## 2025-04-14 RX ORDER — HYDRALAZINE HYDROCHLORIDE 20 MG/ML
5 INJECTION INTRAMUSCULAR; INTRAVENOUS ONCE AS NEEDED
Status: DISCONTINUED | OUTPATIENT
Start: 2025-04-14 | End: 2025-04-14 | Stop reason: HOSPADM

## 2025-04-14 RX ORDER — LABETALOL HYDROCHLORIDE 5 MG/ML
20 INJECTION, SOLUTION INTRAVENOUS ONCE AS NEEDED
Status: DISCONTINUED | OUTPATIENT
Start: 2025-04-14 | End: 2025-04-14 | Stop reason: HOSPADM

## 2025-04-14 RX ORDER — CYCLOBENZAPRINE HCL 5 MG
10 TABLET ORAL ONCE
Status: COMPLETED | OUTPATIENT
Start: 2025-04-14 | End: 2025-04-14

## 2025-04-14 RX ADMIN — ACETAMINOPHEN 975 MG: 325 TABLET ORAL at 09:33

## 2025-04-14 RX ADMIN — CYCLOBENZAPRINE HYDROCHLORIDE 10 MG: 5 TABLET, FILM COATED ORAL at 09:33

## 2025-04-14 SDOH — HEALTH STABILITY: MENTAL HEALTH: WISH TO BE DEAD (PAST 1 MONTH): NO

## 2025-04-14 SDOH — SOCIAL STABILITY: SOCIAL INSECURITY: VERBAL ABUSE: DENIES

## 2025-04-14 SDOH — SOCIAL STABILITY: SOCIAL INSECURITY: HAVE YOU HAD ANY THOUGHTS OF HARMING ANYONE ELSE?: NO

## 2025-04-14 SDOH — HEALTH STABILITY: MENTAL HEALTH: SUICIDAL BEHAVIOR (LIFETIME): NO

## 2025-04-14 SDOH — ECONOMIC STABILITY: HOUSING INSECURITY: DO YOU FEEL UNSAFE GOING BACK TO THE PLACE WHERE YOU ARE LIVING?: NO

## 2025-04-14 SDOH — HEALTH STABILITY: MENTAL HEALTH: NON-SPECIFIC ACTIVE SUICIDAL THOUGHTS (PAST 1 MONTH): NO

## 2025-04-14 SDOH — HEALTH STABILITY: MENTAL HEALTH: HAVE YOU USED ANY PRESCRIPTION DRUGS OTHER THAN PRESCRIBED IN THE PAST 12 MONTHS?: NO

## 2025-04-14 SDOH — HEALTH STABILITY: MENTAL HEALTH: HAVE YOU USED ANY SUBSTANCES (CANABIS, COCAINE, HEROIN, HALLUCINOGENS, INHALANTS, ETC.) IN THE PAST 12 MONTHS?: NO

## 2025-04-14 SDOH — SOCIAL STABILITY: SOCIAL INSECURITY: ARE THERE ANY APPARENT SIGNS OF INJURIES/BEHAVIORS THAT COULD BE RELATED TO ABUSE/NEGLECT?: NO

## 2025-04-14 SDOH — SOCIAL STABILITY: SOCIAL INSECURITY: HAS ANYONE EVER THREATENED TO HURT YOUR FAMILY OR YOUR PETS?: NO

## 2025-04-14 SDOH — SOCIAL STABILITY: SOCIAL INSECURITY: DO YOU FEEL ANYONE HAS EXPLOITED OR TAKEN ADVANTAGE OF YOU FINANCIALLY OR OF YOUR PERSONAL PROPERTY?: NO

## 2025-04-14 SDOH — SOCIAL STABILITY: SOCIAL INSECURITY: HAVE YOU HAD THOUGHTS OF HARMING ANYONE ELSE?: NO

## 2025-04-14 SDOH — SOCIAL STABILITY: SOCIAL INSECURITY: ARE YOU OR HAVE YOU BEEN THREATENED OR ABUSED PHYSICALLY, EMOTIONALLY, OR SEXUALLY BY ANYONE?: NO

## 2025-04-14 SDOH — SOCIAL STABILITY: SOCIAL INSECURITY: ABUSE SCREEN: ADULT

## 2025-04-14 SDOH — HEALTH STABILITY: MENTAL HEALTH: WERE YOU ABLE TO COMPLETE ALL THE BEHAVIORAL HEALTH SCREENINGS?: YES

## 2025-04-14 SDOH — SOCIAL STABILITY: SOCIAL INSECURITY: PHYSICAL ABUSE: DENIES

## 2025-04-14 SDOH — SOCIAL STABILITY: SOCIAL INSECURITY: DOES ANYONE TRY TO KEEP YOU FROM HAVING/CONTACTING OTHER FRIENDS OR DOING THINGS OUTSIDE YOUR HOME?: NO

## 2025-04-14 ASSESSMENT — LIFESTYLE VARIABLES
AUDIT-C TOTAL SCORE: 0
HOW OFTEN DO YOU HAVE 6 OR MORE DRINKS ON ONE OCCASION: NEVER
SKIP TO QUESTIONS 9-10: 1
AUDIT-C TOTAL SCORE: 0
HOW MANY STANDARD DRINKS CONTAINING ALCOHOL DO YOU HAVE ON A TYPICAL DAY: PATIENT DOES NOT DRINK
HOW OFTEN DO YOU HAVE A DRINK CONTAINING ALCOHOL: NEVER

## 2025-04-14 ASSESSMENT — PAIN SCALES - GENERAL
PAINLEVEL_OUTOF10: 6
PAINLEVEL_OUTOF10: 6

## 2025-04-14 ASSESSMENT — PATIENT HEALTH QUESTIONNAIRE - PHQ9
2. FEELING DOWN, DEPRESSED OR HOPELESS: NOT AT ALL
SUM OF ALL RESPONSES TO PHQ9 QUESTIONS 1 & 2: 0
1. LITTLE INTEREST OR PLEASURE IN DOING THINGS: NOT AT ALL

## 2025-04-14 ASSESSMENT — PAIN DESCRIPTION - LOCATION: LOCATION: GENERALIZED

## 2025-04-14 NOTE — H&P
OB Triage H&P    Assessment/Plan    Matti Womack is a 29 y.o.  at 32w5d, TABBY: 2025, by Last Menstrual Period, who presents to triage with pain and possible leaking of fluid since fall on Friday..    Plan  Tylenol/flexeril  -Fetal monitoring reassuring  -Good fetal movement  -Up to date on prenatal care  -Continue routine prenatal care    Dispo  Pending monitoring        Pregnancy Problems (from 10/21/24 to present)       Problem Noted Diagnosed Resolved    30 weeks gestation of pregnancy ([facility]) 2024 by Benson Wharton MD  No    Priority:  Medium       Overview Addendum 2025 10:09 AM by Leandro Hirsch MD     Desired provider in labor: [] CNM  [] Physician  [x] Blood Products: [] Yes, accepts [] No, needs counseling  [x] Initial BMI: 36.50   [x] Prenatal Labs:   [x] Cervical Cancer Screening up to date  [x] Rh status: Positive  [x] Genetic Screening:    [x] NT US: (11-13 wks)  [] Baby ASA (if indicated): On Lovenox, risks reviewed declined  [x] Pregnancy dated by: Dates and ultrasound    [x] Anatomy US: (19-20 wks)  [] Federal Sterilization consent signed (if indicated):  [] 1hr GCT at 24-28wks:  [] Rhogam (if indicated):   [] Fetal Surveillance (if indicated):  [] Tdap (27-32 wks, may be given up to 36 wks if initial window missed):   [] RSV (32-36 wks) (Sept. to end of ):  [x] Flu Vaccine: Declined  [x] Breastfeeding:  [] Postpartum Birth control method:   [] GBS at 36 - 37 wks:  [x] 39 weeks discussion of IOL vs. Expectant management:  [x] Mode of delivery ( anticipated ): Vaginal         Mild intermittent asthma without complication ([facility]) 2019 by Lucina Ward APRN-CNP  No    Priority:  Medium       Overview Addendum 2024 11:18 AM by Benson Wharton MD     Albuterol PRN         Nausea and vomiting in pregnancy 2024 by Benson Wharton MD  2024 by Leandro Hirsch MD    Overview Signed 2024 11:17 AM by Benson Wharton MD     : rx  "b6/Doxylamine                 Subjective   Good fetal movement.  Denies vaginal bleeding., possible LOF X 1, contractions since Friday. States she slipped on water when at a pool hitting her left side above her hip extending up to shoulder area. Did not hit abdomen. Tried tylenol for discomfort yesterday but did not get much relief. Was up to bathroom on Saturday and felt \"continued leaking after voiding\" but this did not persist. States she feels contractions every couple of minutes since fall.   Pregnancy complicated by H/O PE (on Lovenox), obesity.    Prenatal Provider Joycelyn    OB History    Para Term  AB Living   5 4 4 0 0 4   SAB IAB Ectopic Multiple Live Births   0 0 0 0 4      # Outcome Date GA Lbr Evens/2nd Weight Sex Type Anes PTL Lv   5 Current            4 Term 20    M Vag-Spont EPI  JESSY   3 Term 19    F Vag-Spont EPI  JESSY   2 Term 17   4.196 kg F Vag-Spont EPI  JESSY   1 Term 14    F Vag-Spont EPI  JESSY       No past surgical history on file.    Social History     Tobacco Use    Smoking status: Never    Smokeless tobacco: Never   Substance Use Topics    Alcohol use: Not Currently     Comment: occasional       No Known Allergies    Medications Prior to Admission   Medication Sig Dispense Refill Last Dose/Taking    cholecalciferol, vitamin D3, 250 mcg (10,000 unit) capsule Take 1 capsule (250 mcg) by mouth 1 (one) time per week. 13 capsule 3 2025 Morning    enoxaparin (Lovenox) 40 mg/0.4 mL syringe Inject 0.4 mL (40 mg) under the skin once daily. 30 each 11 2025    Prenatal Vitamin 27 mg iron- 0.8 mg tablet TAKE 1 TABLET BY MOUTH EVERY DAY 30 tablet 0 2025 Morning    albuterol 90 mcg/actuation inhaler Inhale 2 puffs every 4 hours if needed for wheezing or shortness of breath. 18 g 1     doxylamine (Unisom) 25 mg tablet Take 1 tablet (25 mg) by mouth once daily at bedtime. (Patient not taking: Reported on 2025) 60 tablet 0     ferrous gluconate 324 (38 " Fe) mg tablet Take 1 tablet with a meal 3 days per week (Patient not taking: Reported on 1/20/2025) 84 tablet 1      Objective     Last Vitals  Temp Pulse Resp BP MAP O2 Sat   36 °C (96.8 °F) (!) 117 18 115/68 86 98 %     Blood Pressures         4/14/2025  0854 4/14/2025  0855          BP: 115/68 115/68               Physical Exam  General: NAD, mood appropriate  Cardiopulmonary: warm and well perfused, breathing comfortably on room air  Abdomen: Gravid, non-tender  Extremities: Symmetric  Speculum Exam: no pooling of fluid seen, Ferning test is negative  Cervix: Closed /  /      Chaperone Present: Yes.  Chaperone Name/Title: Nadiya ALVAREZ nurse  Examination Chaperoned: Gynecological Exam     Fetal Monitoring  Baseline: 130 bpm, Variability: moderate,  Accelerations: present and Decelerations: none  Uterine Activity: No contractions seen on toco  Interpretation: Reactive    Bedside ultrasound: No    Labs in chart were reviewed.

## 2025-04-14 NOTE — CARE PLAN
Over the shift, the patient made progress toward the following goals. Barriers to progression include none    Problem: Antepartum  Goal: Maintain pregnancy as long as maternal and/or fetal condition is stable  Outcome: Met  Goal: Avoid/minimize constipation  Outcome: Met  Goal: No decrease in circulation/VTE  Outcome: Met  Goal: FHR remains reassuring  Outcome: Met  Goal: Minimize anxiety/maximize coping  Outcome: Met     Patient discharged home. To follow up at scheduled appointment. Education given.

## 2025-04-14 NOTE — LETTER
April 14, 2025     Patient: Matti Womack   YOB: 1995   Date of Visit: 4/14/2025       To Whom It May Concern:    Matti Womack was seen in OB triage on 4/14/2025 at . Please excuse Matti for her absence from work on this day. She may return to work on 4/15/25.    If you have any questions or concerns, please don't hesitate to call.         Sincerely,     Bekah Landry DO        CC: No Recipients

## 2025-04-14 NOTE — DISCHARGE INSTRUCTIONS
Call provider or return to triage with any vaginal bleeding, leaking fluid or decreased fetal movement.

## 2025-04-21 ENCOUNTER — APPOINTMENT (OUTPATIENT)
Dept: OBSTETRICS AND GYNECOLOGY | Facility: CLINIC | Age: 30
End: 2025-04-21
Payer: COMMERCIAL

## 2025-04-21 VITALS — WEIGHT: 274 LBS | BODY MASS INDEX: 41.66 KG/M2 | SYSTOLIC BLOOD PRESSURE: 125 MMHG | DIASTOLIC BLOOD PRESSURE: 81 MMHG

## 2025-04-21 DIAGNOSIS — Z86.711 HISTORY OF PULMONARY EMBOLISM: ICD-10-CM

## 2025-04-21 DIAGNOSIS — J45.20 MILD INTERMITTENT ASTHMA WITHOUT COMPLICATION (HHS-HCC): ICD-10-CM

## 2025-04-21 DIAGNOSIS — G40.909 SEIZURE DISORDER (MULTI): ICD-10-CM

## 2025-04-21 DIAGNOSIS — Z3A.33 33 WEEKS GESTATION OF PREGNANCY (HHS-HCC): Primary | ICD-10-CM

## 2025-04-21 PROCEDURE — 99213 OFFICE O/P EST LOW 20 MIN: CPT | Performed by: OBSTETRICS & GYNECOLOGY

## 2025-04-21 RX ORDER — ENOXAPARIN SODIUM 100 MG/ML
40 INJECTION SUBCUTANEOUS 2 TIMES DAILY
Qty: 60 EACH | Refills: 11 | Status: SHIPPED | OUTPATIENT
Start: 2025-04-21 | End: 2025-04-21 | Stop reason: SDUPTHER

## 2025-04-21 RX ORDER — ENOXAPARIN SODIUM 100 MG/ML
40 INJECTION SUBCUTANEOUS 2 TIMES DAILY
Qty: 60 EACH | Refills: 11 | Status: SHIPPED | OUTPATIENT
Start: 2025-04-21 | End: 2026-04-21

## 2025-04-21 NOTE — PROGRESS NOTES
Ob Visit  25     SUBJECTIVE      HPI: Matti Womack is a 29 y.o.  at 33w5d here for RPNV.  She has no no contractions, no bleeding, or no LOF. Reports active normal fetal movement. Patient reports complaints of fatigue and round ligament pain       OBJECTIVE  Visit Vitals  /81   Wt 124 kg (274 lb)   LMP 2024   BMI 41.66 kg/m²   OB Status Pregnant   Smoking Status Never   BSA 2.44 m²            ASSESSMENT & PLAN    Matti Womack is a 29 y.o.  at 33w5d here for the following concerns we addressed today:    Problem List Items Addressed This Visit       Seizure disorder (Multi)    Mild intermittent asthma without complication ([facility])    Overview   Albuterol PRN         History of pulmonary embolism - Primary    Overview   see epic note 18  INTMED visit s/p hospitalization at [facility] 2018 for pulmonary embolus started on xarelto and eliquis which patient self d/c'd several weeks after MFM consult done  19 and started on Lovenox  - On lovenox 40 mg twice daily         33 weeks gestation of pregnancy ([facility])    Overview   Desired provider in labor: [] CNM  [] Physician  [x] Blood Products: [] Yes, accepts [] No, needs counseling  [x] Initial BMI: 36.50   [x] Prenatal Labs:   [x] Cervical Cancer Screening up to date  [x] Rh status: Positive  [x] Genetic Screening:    [x] NT US: (11-13 wks)  [] Baby ASA (if indicated): On Lovenox, risks reviewed declined  [x] Pregnancy dated by: Dates and ultrasound    [x] Anatomy US: (19-20 wks)  [] Federal Sterilization consent signed (if indicated):  [] 1hr GCT at 24-28wks:  [] Rhogam (if indicated):   [] Fetal Surveillance (if indicated):  [] Tdap (27-32 wks, may be given up to 36 wks if initial window missed): Declined  [] RSV (32-36 wks) (Sept. to end of ):  [x] Flu Vaccine: Declined  [x] Breastfeeding:  [] Postpartum Birth control method:   [] GBS at 36 - 37 wks:  [x] 39 weeks discussion of IOL vs. Expectant management:  [x] Mode of  delivery ( anticipated ): Vaginal         Current Assessment & Plan   Patient complains of feeling tired.  Still having round ligament pain.  No change  Baby is active  Discussed Lovenox dosage.  Patient has only been taking it once a day.  By way should be on approximately 36 to 40 mg twice a day.  Encouraged to increase Lovenox to 40 mg twice a day instead of once a day.  Ultrasound scheduled  Return to office in 2 weeks.  Start nonstress testing as precaution.  Fetal kick counts now.              RTC in 2 weeks      Leandro Hirsch MD

## 2025-04-21 NOTE — ASSESSMENT & PLAN NOTE
Patient complains of feeling tired.  Still having round ligament pain.  No change  Baby is active  Discussed Lovenox dosage.  Patient has only been taking it once a day.  By way should be on approximately 36 to 40 mg twice a day.  Encouraged to increase Lovenox to 40 mg twice a day instead of once a day.  Ultrasound scheduled  Return to office in 2 weeks.  Start nonstress testing as precaution.  Fetal kick counts now.

## 2025-04-22 ENCOUNTER — HOSPITAL ENCOUNTER (OUTPATIENT)
Facility: HOSPITAL | Age: 30
Discharge: HOME | End: 2025-04-22
Attending: OBSTETRICS & GYNECOLOGY | Admitting: OBSTETRICS & GYNECOLOGY
Payer: COMMERCIAL

## 2025-04-22 ENCOUNTER — HOSPITAL ENCOUNTER (OUTPATIENT)
Facility: HOSPITAL | Age: 30
End: 2025-04-22
Attending: OBSTETRICS & GYNECOLOGY | Admitting: OBSTETRICS & GYNECOLOGY
Payer: COMMERCIAL

## 2025-04-22 VITALS
OXYGEN SATURATION: 97 % | BODY MASS INDEX: 41.66 KG/M2 | RESPIRATION RATE: 18 BRPM | DIASTOLIC BLOOD PRESSURE: 75 MMHG | TEMPERATURE: 97.7 F | SYSTOLIC BLOOD PRESSURE: 128 MMHG | HEART RATE: 128 BPM | HEIGHT: 68 IN

## 2025-04-22 PROCEDURE — 99214 OFFICE O/P EST MOD 30 MIN: CPT | Performed by: OBSTETRICS & GYNECOLOGY

## 2025-04-22 PROCEDURE — 99215 OFFICE O/P EST HI 40 MIN: CPT | Mod: 25

## 2025-04-22 PROCEDURE — 59025 FETAL NON-STRESS TEST: CPT | Performed by: OBSTETRICS & GYNECOLOGY

## 2025-04-22 RX ORDER — HYDRALAZINE HYDROCHLORIDE 20 MG/ML
5 INJECTION INTRAMUSCULAR; INTRAVENOUS ONCE AS NEEDED
Status: DISCONTINUED | OUTPATIENT
Start: 2025-04-22 | End: 2025-04-23 | Stop reason: HOSPADM

## 2025-04-22 RX ORDER — ONDANSETRON HYDROCHLORIDE 2 MG/ML
4 INJECTION, SOLUTION INTRAVENOUS EVERY 6 HOURS PRN
Status: DISCONTINUED | OUTPATIENT
Start: 2025-04-22 | End: 2025-04-23 | Stop reason: HOSPADM

## 2025-04-22 RX ORDER — LIDOCAINE HYDROCHLORIDE 10 MG/ML
0.5 INJECTION, SOLUTION EPIDURAL; INFILTRATION; INTRACAUDAL; PERINEURAL ONCE AS NEEDED
Status: DISCONTINUED | OUTPATIENT
Start: 2025-04-22 | End: 2025-04-23 | Stop reason: HOSPADM

## 2025-04-22 RX ORDER — LABETALOL HYDROCHLORIDE 5 MG/ML
20 INJECTION, SOLUTION INTRAVENOUS ONCE AS NEEDED
Status: DISCONTINUED | OUTPATIENT
Start: 2025-04-22 | End: 2025-04-23 | Stop reason: HOSPADM

## 2025-04-22 RX ORDER — ONDANSETRON 4 MG/1
4 TABLET, FILM COATED ORAL EVERY 6 HOURS PRN
Status: DISCONTINUED | OUTPATIENT
Start: 2025-04-22 | End: 2025-04-23 | Stop reason: HOSPADM

## 2025-04-22 SDOH — HEALTH STABILITY: MENTAL HEALTH: WISH TO BE DEAD (PAST 1 MONTH): NO

## 2025-04-22 SDOH — HEALTH STABILITY: MENTAL HEALTH: SUICIDAL BEHAVIOR (LIFETIME): NO

## 2025-04-22 SDOH — SOCIAL STABILITY: SOCIAL INSECURITY: HAS ANYONE EVER THREATENED TO HURT YOUR FAMILY OR YOUR PETS?: NO

## 2025-04-22 SDOH — SOCIAL STABILITY: SOCIAL INSECURITY: VERBAL ABUSE: DENIES

## 2025-04-22 SDOH — ECONOMIC STABILITY: HOUSING INSECURITY: DO YOU FEEL UNSAFE GOING BACK TO THE PLACE WHERE YOU ARE LIVING?: NO

## 2025-04-22 SDOH — SOCIAL STABILITY: SOCIAL INSECURITY: ARE THERE ANY APPARENT SIGNS OF INJURIES/BEHAVIORS THAT COULD BE RELATED TO ABUSE/NEGLECT?: NO

## 2025-04-22 SDOH — SOCIAL STABILITY: SOCIAL INSECURITY: HAVE YOU HAD THOUGHTS OF HARMING ANYONE ELSE?: NO

## 2025-04-22 SDOH — SOCIAL STABILITY: SOCIAL INSECURITY: DOES ANYONE TRY TO KEEP YOU FROM HAVING/CONTACTING OTHER FRIENDS OR DOING THINGS OUTSIDE YOUR HOME?: NO

## 2025-04-22 SDOH — HEALTH STABILITY: MENTAL HEALTH: WERE YOU ABLE TO COMPLETE ALL THE BEHAVIORAL HEALTH SCREENINGS?: YES

## 2025-04-22 SDOH — SOCIAL STABILITY: SOCIAL INSECURITY: ARE YOU OR HAVE YOU BEEN THREATENED OR ABUSED PHYSICALLY, EMOTIONALLY, OR SEXUALLY BY ANYONE?: NO

## 2025-04-22 SDOH — HEALTH STABILITY: MENTAL HEALTH: NON-SPECIFIC ACTIVE SUICIDAL THOUGHTS (PAST 1 MONTH): NO

## 2025-04-22 SDOH — SOCIAL STABILITY: SOCIAL INSECURITY: HAVE YOU HAD ANY THOUGHTS OF HARMING ANYONE ELSE?: NO

## 2025-04-22 SDOH — SOCIAL STABILITY: SOCIAL INSECURITY: PHYSICAL ABUSE: DENIES

## 2025-04-22 SDOH — SOCIAL STABILITY: SOCIAL INSECURITY: DO YOU FEEL ANYONE HAS EXPLOITED OR TAKEN ADVANTAGE OF YOU FINANCIALLY OR OF YOUR PERSONAL PROPERTY?: NO

## 2025-04-22 SDOH — SOCIAL STABILITY: SOCIAL INSECURITY: ABUSE SCREEN: ADULT

## 2025-04-22 ASSESSMENT — LIFESTYLE VARIABLES
SKIP TO QUESTIONS 9-10: 1
AUDIT-C TOTAL SCORE: 0
AUDIT-C TOTAL SCORE: 0
HOW MANY STANDARD DRINKS CONTAINING ALCOHOL DO YOU HAVE ON A TYPICAL DAY: PATIENT DOES NOT DRINK
HOW OFTEN DO YOU HAVE 6 OR MORE DRINKS ON ONE OCCASION: NEVER
HOW OFTEN DO YOU HAVE A DRINK CONTAINING ALCOHOL: NEVER

## 2025-04-22 ASSESSMENT — PATIENT HEALTH QUESTIONNAIRE - PHQ9
SUM OF ALL RESPONSES TO PHQ9 QUESTIONS 1 & 2: 0
2. FEELING DOWN, DEPRESSED OR HOPELESS: NOT AT ALL
1. LITTLE INTEREST OR PLEASURE IN DOING THINGS: NOT AT ALL

## 2025-04-22 ASSESSMENT — PAIN SCALES - GENERAL: PAINLEVEL_OUTOF10: 0 - NO PAIN

## 2025-04-23 NOTE — H&P
OB Triage H&P    Assessment/Plan    Matti Womack is a 29 y.o.  at 33w6d, TABBY: 2025, by Last Menstrual Period, who presents to triage with recent spotting yesterday and today.  Notes mild bh ctx.  No cervical change and nst is reactive.    Plan  No evidence of  labor  -Fetal monitoring reassuring  -Good fetal movement  -Up to date on prenatal care  -Continue routine prenatal care    Dispo  -Patient appropriate for discharge home, agrees with plan  -Return precautions discussed   -Follow up at next scheduled OB appointment or to triage sooner as needed        Pregnancy Problems (from 10/21/24 to present)       Problem Noted Diagnosed Resolved    33 weeks gestation of pregnancy ([facility]) 2024 by Benson Wharton MD  No    Priority:  Medium       Overview Addendum 2025  8:57 AM by Leandro Hirsch MD   Desired provider in labor: [] CNM  [] Physician  [x] Blood Products: [] Yes, accepts [] No, needs counseling  [x] Initial BMI: 36.50   [x] Prenatal Labs:   [x] Cervical Cancer Screening up to date  [x] Rh status: Positive  [x] Genetic Screening:    [x] NT US: (11-13 wks)  [] Baby ASA (if indicated): On Lovenox, risks reviewed declined  [x] Pregnancy dated by: Dates and ultrasound    [x] Anatomy US: (19-20 wks)  [] Federal Sterilization consent signed (if indicated):  [] 1hr GCT at 24-28wks:  [] Rhogam (if indicated):   [] Fetal Surveillance (if indicated):  [] Tdap (27-32 wks, may be given up to 36 wks if initial window missed): Declined  [] RSV (32-36 wks) (Sept. to end of ):  [x] Flu Vaccine: Declined  [x] Breastfeeding:  [] Postpartum Birth control method:   [] GBS at 36 - 37 wks:  [x] 39 weeks discussion of IOL vs. Expectant management:  [x] Mode of delivery ( anticipated ): Vaginal         Mild intermittent asthma without complication ([facility]) 2019 by Lucina Ward, APRN-CNP  No    Priority:  Medium       Overview Addendum 2024 11:18 AM by Benson Wharton MD    Albuterol PRN         Nausea and vomiting in pregnancy 2024 by Benson Wharton MD  2024 by Leandro Hirsch MD    Overview Signed 2024 11:17 AM by Benson Wharton MD   : rx b6/Doxylamine                 Subjective   Good fetal movement.      Prenatal Provider angelina    OB History    Para Term  AB Living   5 4 4 0 0 4   SAB IAB Ectopic Multiple Live Births   0 0 0 0 4      # Outcome Date GA Lbr Evens/2nd Weight Sex Type Anes PTL Lv   5 Current            4 Term 20    M Vag-Spont EPI  JESSY   3 Term 19    F Vag-Spont EPI  JESSY   2 Term 17   4.196 kg F Vag-Spont EPI  JESSY   1 Term 14    F Vag-Spont EPI  JESSY       Surgical History[1]    Social History     Tobacco Use    Smoking status: Never    Smokeless tobacco: Never   Substance Use Topics    Alcohol use: Not Currently     Comment: occasional       Allergies[2]    Prescriptions Prior to Admission[3]  Objective     Last Vitals  Temp Pulse Resp BP MAP O2 Sat   36.5 °C (97.7 °F) (!) 128 18 128/75 93 97 %     Blood Pressures         2025  2205             BP: 128/75             Physical Exam  General: NAD, mood appropriate  Cardiopulmonary: warm and well perfused, breathing comfortably on room air  Abdomen: Gravid, non-tender.  Fundus is soft, nt, even during mild ctx  Extremities: Symmetric  Speculum Exam:  no blood seen, deferred  Cervix:   0/  0/  -3    Chaperone Present: Yes.  Chaperone Name/Title: nurse  Examination Chaperoned: Gynecological Exam     Fetal Monitoring  Baseline: 120 bpm, Variability: moderate,  Accelerations: present and Decelerations: none  Mild ctx q 3-6 min.  Pt comfortable with  Interpretation: Reactive      Rh pos                 [1] No past surgical history on file.  [2] No Known Allergies  [3]   Medications Prior to Admission   Medication Sig Dispense Refill Last Dose/Taking    cholecalciferol, vitamin D3, 250 mcg (10,000 unit) capsule Take 1 capsule (250 mcg) by mouth 1 (one)  time per week. 13 capsule 3 Past Month    enoxaparin (Lovenox) 40 mg/0.4 mL syringe Inject 0.4 mL (40 mg) under the skin 2 times a day. 60 each 11 4/22/2025    Prenatal Vitamin 27 mg iron- 0.8 mg tablet TAKE 1 TABLET BY MOUTH EVERY DAY 30 tablet 0 4/22/2025    ferrous gluconate 324 (38 Fe) mg tablet Take 1 tablet with a meal 3 days per week 84 tablet 1

## 2025-05-08 ENCOUNTER — HOSPITAL ENCOUNTER (OUTPATIENT)
Dept: RADIOLOGY | Facility: CLINIC | Age: 30
Discharge: HOME | End: 2025-05-08
Payer: COMMERCIAL

## 2025-05-08 DIAGNOSIS — O99.213 OBESITY COMPLICATING PREGNANCY, THIRD TRIMESTER (HHS-HCC): ICD-10-CM

## 2025-05-08 DIAGNOSIS — Z3A.01 LESS THAN 8 WEEKS GESTATION OF PREGNANCY (HHS-HCC): ICD-10-CM

## 2025-05-08 PROCEDURE — 76816 OB US FOLLOW-UP PER FETUS: CPT

## 2025-05-08 PROCEDURE — 76819 FETAL BIOPHYS PROFIL W/O NST: CPT

## 2025-05-12 ENCOUNTER — APPOINTMENT (OUTPATIENT)
Dept: OBSTETRICS AND GYNECOLOGY | Facility: CLINIC | Age: 30
End: 2025-05-12
Payer: COMMERCIAL

## 2025-05-12 VITALS — WEIGHT: 278 LBS | DIASTOLIC BLOOD PRESSURE: 77 MMHG | BODY MASS INDEX: 42.27 KG/M2 | SYSTOLIC BLOOD PRESSURE: 125 MMHG

## 2025-05-12 DIAGNOSIS — Z3A.36 36 WEEKS GESTATION OF PREGNANCY (HHS-HCC): Primary | ICD-10-CM

## 2025-05-12 DIAGNOSIS — Z3A.39 39 WEEKS GESTATION OF PREGNANCY (HHS-HCC): Primary | ICD-10-CM

## 2025-05-12 DIAGNOSIS — E66.9 OBESITY WITH BODY MASS INDEX 30 OR GREATER: ICD-10-CM

## 2025-05-12 DIAGNOSIS — J45.20 MILD INTERMITTENT ASTHMA WITHOUT COMPLICATION (HHS-HCC): ICD-10-CM

## 2025-05-12 DIAGNOSIS — Z86.711 HISTORY OF PULMONARY EMBOLISM: ICD-10-CM

## 2025-05-12 PROBLEM — R73.03 PREDIABETES: Status: RESOLVED | Noted: 2023-02-08 | Resolved: 2025-05-12

## 2025-05-12 PROBLEM — N94.9 PAIN OF ROUND LIGAMENT: Status: RESOLVED | Noted: 2025-01-30 | Resolved: 2025-05-12
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
## 2025-05-12 PROBLEM — G40.909 SEIZURE DISORDER (MULTI): Status: RESOLVED | Noted: 2019-02-01 | Resolved: 2025-05-12

## 2025-05-12 PROBLEM — Z3A.33 33 WEEKS GESTATION OF PREGNANCY (HHS-HCC): Status: RESOLVED | Noted: 2024-11-05 | Resolved: 2025-05-12

## 2025-05-12 PROCEDURE — 99213 OFFICE O/P EST LOW 20 MIN: CPT | Performed by: OBSTETRICS & GYNECOLOGY

## 2025-05-12 PROCEDURE — 59025 FETAL NON-STRESS TEST: CPT | Performed by: OBSTETRICS & GYNECOLOGY

## 2025-05-12 NOTE — PROGRESS NOTES
Ob Visit  25     SUBJECTIVE      HPI: Matti Womack is a 30 y.o.  at 36w5d here for RPNV.  She has no contractions, no bleeding, or no LOF. Reports active normal fetal movement. Patient reports no issues.  Lovenox 40 mg subcu daily       OBJECTIVE  Visit Vitals  /77   Wt 126 kg (278 lb)   LMP 2024   BMI 42.27 kg/m²   OB Status Pregnant   Smoking Status Never   BSA 2.46 m²            ASSESSMENT & PLAN    Matti Womack is a 30 y.o.  at 36w5d here for the following concerns we addressed today:    Problem List Items Addressed This Visit       Obesity with body mass index 30 or greater    Mild intermittent asthma without complication ([facility])    Overview   Albuterol PRN         History of pulmonary embolism    Overview   see epic note 18  INTMED visit s/p hospitalization at [facility] 2018 for pulmonary embolus started on xarelto and eliquis which patient self d/c'd several weeks after MFM consult done  19 and started on Lovenox  - On lovenox 40 mg twice daily         Relevant Orders    Follow Up In Obstetrics    36 weeks gestation of pregnancy ([facility]) - Primary    Overview   Desired provider in labor: [] CNM  [] Physician  [x] Blood Products: [] Yes, accepts [] No, needs counseling  [x] Initial BMI: 36.50   [x] Prenatal Labs:   [x] Cervical Cancer Screening up to date  [x] Rh status: Positive  [x] Genetic Screening:    [x] NT US: (11-13 wks)  [] Baby ASA (if indicated): On Lovenox, risks reviewed declined  [x] Pregnancy dated by: Dates and ultrasound    [x] Anatomy US: (19-20 wks)  [] Federal Sterilization consent signed (if indicated):  [] 1hr GCT at 24-28wks:  [] Rhogam (if indicated):   [] Fetal Surveillance (if indicated):  [] Tdap (27-32 wks, may be given up to 36 wks if initial window missed): Declined  [] RSV (32-36 wks) (Sept. to end of ):  [x] Flu Vaccine: Declined  [x] Breastfeeding:  [] Postpartum Birth control method:   [x] GBS at 36 - 37 wks:  [x] 39 weeks  discussion of IOL vs. Expectant management:  [x] Mode of delivery ( anticipated ): Vaginal         Current Assessment & Plan   Patient feeling well no complaints.  Baby is active.  36-week labs ordered  Patient on Lovenox 40 mg twice daily subcu.  Discussed induction at 39 weeks risks and benefits.  NST reactive  Return to office in 1 week for nonstress test         Relevant Orders    Group B Streptococcus (GBS) Prenatal Screen, Culture    C. trachomatis / N. gonorrhoeae, Amplified, Urogenital    CBC Anemia Panel With Reflex,Pregnancy    Fetal nonstress test    Follow Up In Obstetrics     Other Visit Diagnoses         BMI 40.0-44.9, adult (Multi)        Relevant Orders    Follow Up In Obstetrics              RTC in 1 weeks      Leandro Hirsch MD

## 2025-05-12 NOTE — ASSESSMENT & PLAN NOTE
Patient feeling well no complaints.  Baby is active.  36-week labs ordered  Patient on Lovenox 40 mg twice daily subcu.  Discussed induction at 39 weeks risks and benefits.  NST reactive  Return to office in 1 week for nonstress test

## 2025-05-12 NOTE — PROCEDURES
padmini Womack, a  at 36w5d with an TABBY of 2025, by Last Menstrual Period, was seen at [facility] for a nonstress test.    Non-Stress Test   Baseline Fetal Heart Rate for Non-Stress Test: 140 BPM  Variability in Waveform for Non-Stress Test: Moderate  Accelerations in Non-Stress Test: Yes, lasting at least 15 seconds  Decelerations in Non-Stress Test: None  Contractions in Non-Stress Test: Not present  Acoustic Stimulator for Non-Stress Test: No  Interpretation of Non-Stress Test   Interpretation of Non-Stress Test: Reactive  NST for Multiple Fetuses: No

## 2025-05-13 LAB
C TRACH RRNA SPEC QL NAA+PROBE: NOT DETECTED
N GONORRHOEA RRNA SPEC QL NAA+PROBE: NOT DETECTED
QUEST GC CT AMPLIFIED (ALWAYS MESSAGE): NORMAL

## 2025-05-15 LAB — GP B STREP SPEC QL CULT: NORMAL

## 2025-05-19 ENCOUNTER — LAB (OUTPATIENT)
Dept: LAB | Facility: HOSPITAL | Age: 30
End: 2025-05-19
Payer: COMMERCIAL

## 2025-05-19 ENCOUNTER — APPOINTMENT (OUTPATIENT)
Dept: OBSTETRICS AND GYNECOLOGY | Facility: CLINIC | Age: 30
End: 2025-05-19
Payer: COMMERCIAL

## 2025-05-19 VITALS — BODY MASS INDEX: 42.73 KG/M2 | WEIGHT: 281 LBS | SYSTOLIC BLOOD PRESSURE: 125 MMHG | DIASTOLIC BLOOD PRESSURE: 77 MMHG

## 2025-05-19 DIAGNOSIS — Z3A.36 36 WEEKS GESTATION OF PREGNANCY (HHS-HCC): Primary | ICD-10-CM

## 2025-05-19 DIAGNOSIS — Z3A.37 37 WEEKS GESTATION OF PREGNANCY (HHS-HCC): Primary | ICD-10-CM

## 2025-05-19 DIAGNOSIS — E66.9 OBESITY WITH BODY MASS INDEX 30 OR GREATER: ICD-10-CM

## 2025-05-19 DIAGNOSIS — Z86.711 HISTORY OF PULMONARY EMBOLISM: ICD-10-CM

## 2025-05-19 LAB
ERYTHROCYTE [DISTWIDTH] IN BLOOD BY AUTOMATED COUNT: 16.1 % (ref 11.5–14.5)
HCT VFR BLD AUTO: 35.4 % (ref 36–46)
HGB BLD-MCNC: 11 G/DL (ref 12–16)
MCH RBC QN AUTO: 24.8 PG (ref 26–34)
MCHC RBC AUTO-ENTMCNC: 31.1 G/DL (ref 32–36)
MCV RBC AUTO: 80 FL (ref 80–100)
NRBC BLD-RTO: 0 /100 WBCS (ref 0–0)
PLATELET # BLD AUTO: 162 X10*3/UL (ref 150–450)
RBC # BLD AUTO: 4.44 X10*6/UL (ref 4–5.2)
REFLEX ADDED, ANEMIA PANEL: NORMAL
WBC # BLD AUTO: 6.5 X10*3/UL (ref 4.4–11.3)

## 2025-05-19 PROCEDURE — 85027 COMPLETE CBC AUTOMATED: CPT

## 2025-05-19 PROCEDURE — 99213 OFFICE O/P EST LOW 20 MIN: CPT | Performed by: OBSTETRICS & GYNECOLOGY

## 2025-05-19 PROCEDURE — 59025 FETAL NON-STRESS TEST: CPT | Performed by: OBSTETRICS & GYNECOLOGY

## 2025-05-19 NOTE — PROGRESS NOTES
Ob Visit  25     SUBJECTIVE      HPI: Matti Womack is a 30 y.o.  at 37w5d here for RPNV.  She has no no contractions, no bleeding, or no LOF. Reports active normal fetal movement. Patient reports no complaints       OBJECTIVE  Visit Vitals  /77   Wt 127 kg (281 lb)   LMP 2024   BMI 42.73 kg/m²   OB Status Pregnant   Smoking Status Never   BSA 2.47 m²            ASSESSMENT & PLAN    Matti Womack is a 30 y.o.  at 37w5d here for the following concerns we addressed today:    Problem List Items Addressed This Visit       Obesity with body mass index 30 or greater    History of pulmonary embolism    Overview   see epic note 18  INTMED visit s/p hospitalization at [facility] 2018 for pulmonary embolus started on xarelto and eliquis which patient self d/c'd several weeks after MFM consult done  19 and started on Lovenox  - On lovenox 40 mg twice daily         37 weeks gestation of pregnancy ([facility]) - Primary    Overview   Desired provider in labor: [] CNM  [] Physician  [x] Blood Products: [] Yes, accepts [] No, needs counseling  [x] Initial BMI: 36.50   [x] Prenatal Labs:   [x] Cervical Cancer Screening up to date  [x] Rh status: Positive  [x] Genetic Screening:    [x] NT US: (11-13 wks)  [] Baby ASA (if indicated): On Lovenox, risks reviewed declined  [x] Pregnancy dated by: Dates and ultrasound    [x] Anatomy US: (19-20 wks)  [] Federal Sterilization consent signed (if indicated):  [] 1hr GCT at 24-28wks:  [] Rhogam (if indicated):   [] Fetal Surveillance (if indicated):  [] Tdap (27-32 wks, may be given up to 36 wks if initial window missed): Declined  [] RSV (32-36 wks) (Sept. to end of ):  [x] Flu Vaccine: Declined  [x] Breastfeeding:  [] Postpartum Birth control method:   [x] GBS at 36 - 37 wks:  [x] 39 weeks discussion of IOL vs. Expectant management:  [x] Mode of delivery ( anticipated ): Vaginal         Current Assessment & Plan   Patient feeling well no  complaints  Baby is active  Patient is scheduled for induction next Saturday at 39 weeks  NST reactive  Will see postpartum, continue fetal kick counts.  Instructions for stopping Lovenox given              RTC 1 week      Leandro Hirsch MD

## 2025-05-19 NOTE — PROCEDURES
padmini Womack, a  at 37w5d with an TABBY of 2025, by Last Menstrual Period, was seen at [facility] for a nonstress test.    Non-Stress Test   Baseline Fetal Heart Rate for Non-Stress Test: 150 BPM  Variability in Waveform for Non-Stress Test: Moderate  Accelerations in Non-Stress Test: Yes  Decelerations in Non-Stress Test: None  Contractions in Non-Stress Test: Not present  Acoustic Stimulator for Non-Stress Test: No  Interpretation of Non-Stress Test   Interpretation of Non-Stress Test: Reactive  NST for Multiple Fetuses: No

## 2025-05-19 NOTE — ASSESSMENT & PLAN NOTE
Patient feeling well no complaints  Baby is active  Patient is scheduled for induction next Saturday at 39 weeks  NST reactive  Will see postpartum, continue fetal kick counts.  Instructions for stopping Lovenox given

## 2025-05-29 ENCOUNTER — TELEPHONE (OUTPATIENT)
Dept: OBSTETRICS AND GYNECOLOGY | Facility: HOSPITAL | Age: 30
End: 2025-05-29
Payer: COMMERCIAL

## 2025-05-29 NOTE — TELEPHONE ENCOUNTER
Patient verified name and date of birth at start of call and was notified of IOL letter being sent to her [facility] messages. Patient was appreciative and had no further questions, comments or concerns at this time.    CALEB Radford

## 2025-05-31 ENCOUNTER — HOSPITAL ENCOUNTER (INPATIENT)
Facility: HOSPITAL | Age: 30
LOS: 1 days | Discharge: HOME | End: 2025-06-01
Attending: OBSTETRICS & GYNECOLOGY | Admitting: OBSTETRICS & GYNECOLOGY
Payer: COMMERCIAL

## 2025-05-31 ENCOUNTER — ANESTHESIA (OUTPATIENT)
Dept: OBSTETRICS AND GYNECOLOGY | Facility: HOSPITAL | Age: 30
End: 2025-05-31
Payer: COMMERCIAL

## 2025-05-31 ENCOUNTER — ANESTHESIA EVENT (OUTPATIENT)
Dept: OBSTETRICS AND GYNECOLOGY | Facility: HOSPITAL | Age: 30
End: 2025-05-31
Payer: COMMERCIAL

## 2025-05-31 ENCOUNTER — APPOINTMENT (OUTPATIENT)
Dept: OBSTETRICS AND GYNECOLOGY | Facility: HOSPITAL | Age: 30
End: 2025-05-31
Payer: COMMERCIAL

## 2025-05-31 DIAGNOSIS — Z3A.39 39 WEEKS GESTATION OF PREGNANCY (HHS-HCC): ICD-10-CM

## 2025-05-31 DIAGNOSIS — Z86.711 HISTORY OF PULMONARY EMBOLISM: ICD-10-CM

## 2025-05-31 PROBLEM — Z34.90 TERM PREGNANCY (HHS-HCC): Status: ACTIVE | Noted: 2025-05-31

## 2025-05-31 LAB
ABO GROUP (TYPE) IN BLOOD: NORMAL
ANTIBODY SCREEN: NORMAL
ERYTHROCYTE [DISTWIDTH] IN BLOOD BY AUTOMATED COUNT: 15.7 % (ref 11.5–14.5)
HCT VFR BLD AUTO: 36.5 % (ref 36–46)
HGB BLD-MCNC: 11.4 G/DL (ref 12–16)
MCH RBC QN AUTO: 25.2 PG (ref 26–34)
MCHC RBC AUTO-ENTMCNC: 31.2 G/DL (ref 32–36)
MCV RBC AUTO: 81 FL (ref 80–100)
NRBC BLD-RTO: 0 /100 WBCS (ref 0–0)
PLATELET # BLD AUTO: 166 X10*3/UL (ref 150–450)
RBC # BLD AUTO: 4.53 X10*6/UL (ref 4–5.2)
RH FACTOR (ANTIGEN D): NORMAL
WBC # BLD AUTO: 7.8 X10*3/UL (ref 4.4–11.3)

## 2025-05-31 PROCEDURE — 86780 TREPONEMA PALLIDUM: CPT | Mod: AHULAB | Performed by: OBSTETRICS & GYNECOLOGY

## 2025-05-31 PROCEDURE — 3E033VJ INTRODUCTION OF OTHER HORMONE INTO PERIPHERAL VEIN, PERCUTANEOUS APPROACH: ICD-10-PCS | Performed by: OBSTETRICS & GYNECOLOGY

## 2025-05-31 PROCEDURE — 1100000001 HC PRIVATE ROOM DAILY

## 2025-05-31 PROCEDURE — 7210000002 HC LABOR PER HOUR

## 2025-05-31 PROCEDURE — 2500000005 HC RX 250 GENERAL PHARMACY W/O HCPCS: Performed by: OBSTETRICS & GYNECOLOGY

## 2025-05-31 PROCEDURE — 59409 OBSTETRICAL CARE: CPT | Performed by: OBSTETRICS & GYNECOLOGY

## 2025-05-31 PROCEDURE — 86901 BLOOD TYPING SEROLOGIC RH(D): CPT | Performed by: OBSTETRICS & GYNECOLOGY

## 2025-05-31 PROCEDURE — 85027 COMPLETE CBC AUTOMATED: CPT | Performed by: OBSTETRICS & GYNECOLOGY

## 2025-05-31 PROCEDURE — 2720000007 HC OR 272 NO HCPCS

## 2025-05-31 PROCEDURE — 2500000004 HC RX 250 GENERAL PHARMACY W/ HCPCS (ALT 636 FOR OP/ED): Mod: JZ | Performed by: OBSTETRICS & GYNECOLOGY

## 2025-05-31 PROCEDURE — 2500000004 HC RX 250 GENERAL PHARMACY W/ HCPCS (ALT 636 FOR OP/ED): Performed by: OBSTETRICS & GYNECOLOGY

## 2025-05-31 PROCEDURE — 10907ZC DRAINAGE OF AMNIOTIC FLUID, THERAPEUTIC FROM PRODUCTS OF CONCEPTION, VIA NATURAL OR ARTIFICIAL OPENING: ICD-10-PCS | Performed by: OBSTETRICS & GYNECOLOGY

## 2025-05-31 PROCEDURE — 36415 COLL VENOUS BLD VENIPUNCTURE: CPT | Performed by: OBSTETRICS & GYNECOLOGY

## 2025-05-31 PROCEDURE — 2500000001 HC RX 250 WO HCPCS SELF ADMINISTERED DRUGS (ALT 637 FOR MEDICARE OP): Performed by: OBSTETRICS & GYNECOLOGY

## 2025-05-31 RX ORDER — SODIUM CHLORIDE, SODIUM LACTATE, POTASSIUM CHLORIDE, CALCIUM CHLORIDE 600; 310; 30; 20 MG/100ML; MG/100ML; MG/100ML; MG/100ML
75 INJECTION, SOLUTION INTRAVENOUS CONTINUOUS
Status: CANCELLED | OUTPATIENT
Start: 2025-05-31 | End: 2025-06-01

## 2025-05-31 RX ORDER — LOPERAMIDE HYDROCHLORIDE 2 MG/1
4 CAPSULE ORAL EVERY 2 HOUR PRN
Status: DISCONTINUED | OUTPATIENT
Start: 2025-05-31 | End: 2025-06-02 | Stop reason: HOSPADM

## 2025-05-31 RX ORDER — TERBUTALINE SULFATE 1 MG/ML
0.25 INJECTION SUBCUTANEOUS ONCE AS NEEDED
Status: CANCELLED | OUTPATIENT
Start: 2025-05-31

## 2025-05-31 RX ORDER — HYDRALAZINE HYDROCHLORIDE 20 MG/ML
5 INJECTION INTRAMUSCULAR; INTRAVENOUS ONCE AS NEEDED
Status: DISCONTINUED | OUTPATIENT
Start: 2025-05-31 | End: 2025-05-31 | Stop reason: SDUPTHER

## 2025-05-31 RX ORDER — LABETALOL HYDROCHLORIDE 5 MG/ML
20 INJECTION, SOLUTION INTRAVENOUS ONCE AS NEEDED
Status: DISCONTINUED | OUTPATIENT
Start: 2025-05-31 | End: 2025-05-31 | Stop reason: SDUPTHER

## 2025-05-31 RX ORDER — HYDRALAZINE HYDROCHLORIDE 20 MG/ML
5 INJECTION INTRAMUSCULAR; INTRAVENOUS ONCE AS NEEDED
Status: DISCONTINUED | OUTPATIENT
Start: 2025-05-31 | End: 2025-06-02 | Stop reason: HOSPADM

## 2025-05-31 RX ORDER — FENTANYL/ROPIVACAINE/NS/PF 2MCG/ML-.2
0-25 PLASTIC BAG, INJECTION (ML) INJECTION CONTINUOUS
Status: DISCONTINUED | OUTPATIENT
Start: 2025-05-31 | End: 2025-06-02 | Stop reason: HOSPADM

## 2025-05-31 RX ORDER — MISOPROSTOL 200 UG/1
800 TABLET ORAL ONCE AS NEEDED
Status: DISCONTINUED | OUTPATIENT
Start: 2025-05-31 | End: 2025-05-31 | Stop reason: SDUPTHER

## 2025-05-31 RX ORDER — OXYTOCIN 10 [USP'U]/ML
10 INJECTION, SOLUTION INTRAMUSCULAR; INTRAVENOUS ONCE AS NEEDED
Status: CANCELLED | OUTPATIENT
Start: 2025-05-31

## 2025-05-31 RX ORDER — OXYTOCIN/0.9 % SODIUM CHLORIDE 30/500 ML
60 PLASTIC BAG, INJECTION (ML) INTRAVENOUS ONCE AS NEEDED
Status: DISCONTINUED | OUTPATIENT
Start: 2025-05-31 | End: 2025-06-02 | Stop reason: HOSPADM

## 2025-05-31 RX ORDER — MISOPROSTOL 200 UG/1
800 TABLET ORAL ONCE AS NEEDED
Status: DISCONTINUED | OUTPATIENT
Start: 2025-05-31 | End: 2025-06-02 | Stop reason: HOSPADM

## 2025-05-31 RX ORDER — OXYTOCIN/0.9 % SODIUM CHLORIDE 30/500 ML
60 PLASTIC BAG, INJECTION (ML) INTRAVENOUS ONCE AS NEEDED
Status: DISCONTINUED | OUTPATIENT
Start: 2025-05-31 | End: 2025-05-31 | Stop reason: SDUPTHER

## 2025-05-31 RX ORDER — SODIUM CHLORIDE, SODIUM LACTATE, POTASSIUM CHLORIDE, CALCIUM CHLORIDE 600; 310; 30; 20 MG/100ML; MG/100ML; MG/100ML; MG/100ML
75 INJECTION, SOLUTION INTRAVENOUS CONTINUOUS
Status: ACTIVE | OUTPATIENT
Start: 2025-05-31 | End: 2025-06-01

## 2025-05-31 RX ORDER — OXYTOCIN/0.9 % SODIUM CHLORIDE 30/500 ML
2-30 PLASTIC BAG, INJECTION (ML) INTRAVENOUS CONTINUOUS
Status: DISCONTINUED | OUTPATIENT
Start: 2025-05-31 | End: 2025-06-02 | Stop reason: HOSPADM

## 2025-05-31 RX ORDER — CARBOPROST TROMETHAMINE 250 UG/ML
250 INJECTION, SOLUTION INTRAMUSCULAR ONCE AS NEEDED
Status: DISCONTINUED | OUTPATIENT
Start: 2025-05-31 | End: 2025-05-31 | Stop reason: SDUPTHER

## 2025-05-31 RX ORDER — ONDANSETRON HYDROCHLORIDE 2 MG/ML
4 INJECTION, SOLUTION INTRAVENOUS EVERY 6 HOURS PRN
Status: DISCONTINUED | OUTPATIENT
Start: 2025-05-31 | End: 2025-06-02 | Stop reason: HOSPADM

## 2025-05-31 RX ORDER — OXYTOCIN 10 [USP'U]/ML
10 INJECTION, SOLUTION INTRAMUSCULAR; INTRAVENOUS ONCE AS NEEDED
Status: DISCONTINUED | OUTPATIENT
Start: 2025-05-31 | End: 2025-05-31 | Stop reason: SDUPTHER

## 2025-05-31 RX ORDER — ONDANSETRON HYDROCHLORIDE 2 MG/ML
4 INJECTION, SOLUTION INTRAVENOUS EVERY 6 HOURS PRN
Status: DISCONTINUED | OUTPATIENT
Start: 2025-05-31 | End: 2025-05-31 | Stop reason: SDUPTHER

## 2025-05-31 RX ORDER — IBUPROFEN 600 MG/1
600 TABLET, FILM COATED ORAL EVERY 6 HOURS
Status: DISCONTINUED | OUTPATIENT
Start: 2025-05-31 | End: 2025-06-02 | Stop reason: HOSPADM

## 2025-05-31 RX ORDER — LIDOCAINE HYDROCHLORIDE 10 MG/ML
20 INJECTION, SOLUTION INFILTRATION; PERINEURAL ONCE AS NEEDED
Status: CANCELLED | OUTPATIENT
Start: 2025-05-31

## 2025-05-31 RX ORDER — MISOPROSTOL 200 UG/1
800 TABLET ORAL ONCE AS NEEDED
Status: CANCELLED | OUTPATIENT
Start: 2025-05-31

## 2025-05-31 RX ORDER — SIMETHICONE 80 MG
80 TABLET,CHEWABLE ORAL 4 TIMES DAILY PRN
Status: DISCONTINUED | OUTPATIENT
Start: 2025-05-31 | End: 2025-06-02 | Stop reason: HOSPADM

## 2025-05-31 RX ORDER — ENOXAPARIN SODIUM 100 MG/ML
60 INJECTION SUBCUTANEOUS EVERY 24 HOURS
Status: DISCONTINUED | OUTPATIENT
Start: 2025-06-01 | End: 2025-06-02 | Stop reason: HOSPADM

## 2025-05-31 RX ORDER — CALCIUM CARBONATE 200(500)MG
1 TABLET,CHEWABLE ORAL EVERY 6 HOURS PRN
Status: DISCONTINUED | OUTPATIENT
Start: 2025-05-31 | End: 2025-06-02 | Stop reason: HOSPADM

## 2025-05-31 RX ORDER — DIPHENHYDRAMINE HYDROCHLORIDE 50 MG/ML
25 INJECTION, SOLUTION INTRAMUSCULAR; INTRAVENOUS EVERY 6 HOURS PRN
Status: DISCONTINUED | OUTPATIENT
Start: 2025-05-31 | End: 2025-06-02 | Stop reason: HOSPADM

## 2025-05-31 RX ORDER — ONDANSETRON 4 MG/1
4 TABLET, FILM COATED ORAL EVERY 6 HOURS PRN
Status: DISCONTINUED | OUTPATIENT
Start: 2025-05-31 | End: 2025-05-31 | Stop reason: SDUPTHER

## 2025-05-31 RX ORDER — DIPHENHYDRAMINE HCL 25 MG
25 CAPSULE ORAL EVERY 6 HOURS PRN
Status: DISCONTINUED | OUTPATIENT
Start: 2025-05-31 | End: 2025-06-02 | Stop reason: HOSPADM

## 2025-05-31 RX ORDER — METHYLERGONOVINE MALEATE 0.2 MG/ML
0.2 INJECTION INTRAVENOUS ONCE AS NEEDED
Status: CANCELLED | OUTPATIENT
Start: 2025-05-31

## 2025-05-31 RX ORDER — HYDRALAZINE HYDROCHLORIDE 20 MG/ML
5 INJECTION INTRAMUSCULAR; INTRAVENOUS ONCE AS NEEDED
Status: CANCELLED | OUTPATIENT
Start: 2025-05-31

## 2025-05-31 RX ORDER — METHYLERGONOVINE MALEATE 0.2 MG/ML
0.2 INJECTION INTRAVENOUS ONCE AS NEEDED
Status: DISCONTINUED | OUTPATIENT
Start: 2025-05-31 | End: 2025-06-02 | Stop reason: HOSPADM

## 2025-05-31 RX ORDER — LABETALOL HYDROCHLORIDE 5 MG/ML
20 INJECTION, SOLUTION INTRAVENOUS ONCE AS NEEDED
Status: DISCONTINUED | OUTPATIENT
Start: 2025-05-31 | End: 2025-06-02 | Stop reason: HOSPADM

## 2025-05-31 RX ORDER — LABETALOL HYDROCHLORIDE 5 MG/ML
20 INJECTION, SOLUTION INTRAVENOUS ONCE AS NEEDED
Status: CANCELLED | OUTPATIENT
Start: 2025-05-31

## 2025-05-31 RX ORDER — LOPERAMIDE HYDROCHLORIDE 2 MG/1
4 CAPSULE ORAL EVERY 2 HOUR PRN
Status: CANCELLED | OUTPATIENT
Start: 2025-05-31

## 2025-05-31 RX ORDER — OXYTOCIN/0.9 % SODIUM CHLORIDE 30/500 ML
60 PLASTIC BAG, INJECTION (ML) INTRAVENOUS ONCE AS NEEDED
Status: CANCELLED | OUTPATIENT
Start: 2025-05-31

## 2025-05-31 RX ORDER — TRANEXAMIC ACID 1 G/10ML
1000 INJECTION, SOLUTION INTRAVENOUS ONCE AS NEEDED
Status: CANCELLED | OUTPATIENT
Start: 2025-05-31 | End: 2025-06-03

## 2025-05-31 RX ORDER — METHYLERGONOVINE MALEATE 0.2 MG/ML
0.2 INJECTION INTRAVENOUS ONCE AS NEEDED
Status: DISCONTINUED | OUTPATIENT
Start: 2025-05-31 | End: 2025-05-31 | Stop reason: SDUPTHER

## 2025-05-31 RX ORDER — CALCIUM CARBONATE 200(500)MG
1 TABLET,CHEWABLE ORAL EVERY 6 HOURS PRN
Status: CANCELLED | OUTPATIENT
Start: 2025-05-31

## 2025-05-31 RX ORDER — ONDANSETRON 4 MG/1
4 TABLET, FILM COATED ORAL EVERY 6 HOURS PRN
Status: CANCELLED | OUTPATIENT
Start: 2025-05-31

## 2025-05-31 RX ORDER — TRANEXAMIC ACID 1 G/10ML
1000 INJECTION, SOLUTION INTRAVENOUS ONCE AS NEEDED
Status: DISCONTINUED | OUTPATIENT
Start: 2025-05-31 | End: 2025-05-31 | Stop reason: SDUPTHER

## 2025-05-31 RX ORDER — POLYETHYLENE GLYCOL 3350 17 G/17G
17 POWDER, FOR SOLUTION ORAL 2 TIMES DAILY PRN
Status: DISCONTINUED | OUTPATIENT
Start: 2025-05-31 | End: 2025-06-02 | Stop reason: HOSPADM

## 2025-05-31 RX ORDER — TERBUTALINE SULFATE 1 MG/ML
0.25 INJECTION SUBCUTANEOUS ONCE AS NEEDED
Status: DISCONTINUED | OUTPATIENT
Start: 2025-05-31 | End: 2025-05-31

## 2025-05-31 RX ORDER — CARBOPROST TROMETHAMINE 250 UG/ML
250 INJECTION, SOLUTION INTRAMUSCULAR ONCE AS NEEDED
Status: CANCELLED | OUTPATIENT
Start: 2025-05-31

## 2025-05-31 RX ORDER — ONDANSETRON HYDROCHLORIDE 2 MG/ML
4 INJECTION, SOLUTION INTRAVENOUS EVERY 6 HOURS PRN
Status: CANCELLED | OUTPATIENT
Start: 2025-05-31

## 2025-05-31 RX ORDER — OXYTOCIN 10 [USP'U]/ML
10 INJECTION, SOLUTION INTRAMUSCULAR; INTRAVENOUS ONCE AS NEEDED
Status: DISCONTINUED | OUTPATIENT
Start: 2025-05-31 | End: 2025-06-02 | Stop reason: HOSPADM

## 2025-05-31 RX ORDER — TRANEXAMIC ACID 1 G/10ML
1000 INJECTION, SOLUTION INTRAVENOUS ONCE AS NEEDED
Status: DISCONTINUED | OUTPATIENT
Start: 2025-05-31 | End: 2025-06-02 | Stop reason: HOSPADM

## 2025-05-31 RX ORDER — ADHESIVE BANDAGE
10 BANDAGE TOPICAL
Status: DISCONTINUED | OUTPATIENT
Start: 2025-05-31 | End: 2025-06-02 | Stop reason: HOSPADM

## 2025-05-31 RX ORDER — CARBOPROST TROMETHAMINE 250 UG/ML
250 INJECTION, SOLUTION INTRAMUSCULAR ONCE AS NEEDED
Status: DISCONTINUED | OUTPATIENT
Start: 2025-05-31 | End: 2025-06-02 | Stop reason: HOSPADM

## 2025-05-31 RX ORDER — ACETAMINOPHEN 325 MG/1
975 TABLET ORAL EVERY 6 HOURS
Status: DISCONTINUED | OUTPATIENT
Start: 2025-05-31 | End: 2025-06-02 | Stop reason: HOSPADM

## 2025-05-31 RX ORDER — LOPERAMIDE HYDROCHLORIDE 2 MG/1
4 CAPSULE ORAL EVERY 2 HOUR PRN
Status: DISCONTINUED | OUTPATIENT
Start: 2025-05-31 | End: 2025-05-31 | Stop reason: SDUPTHER

## 2025-05-31 RX ORDER — LIDOCAINE HYDROCHLORIDE 10 MG/ML
20 INJECTION, SOLUTION INFILTRATION; PERINEURAL ONCE AS NEEDED
Status: DISCONTINUED | OUTPATIENT
Start: 2025-05-31 | End: 2025-05-31

## 2025-05-31 RX ORDER — ONDANSETRON 4 MG/1
4 TABLET, FILM COATED ORAL EVERY 6 HOURS PRN
Status: DISCONTINUED | OUTPATIENT
Start: 2025-05-31 | End: 2025-06-02 | Stop reason: HOSPADM

## 2025-05-31 RX ADMIN — SODIUM CHLORIDE, POTASSIUM CHLORIDE, SODIUM LACTATE AND CALCIUM CHLORIDE 75 ML/HR: 600; 310; 30; 20 INJECTION, SOLUTION INTRAVENOUS at 09:03

## 2025-05-31 RX ADMIN — Medication 2 MILLI-UNITS/MIN: at 09:08

## 2025-05-31 RX ADMIN — ACETAMINOPHEN 975 MG: 325 TABLET, FILM COATED ORAL at 21:08

## 2025-05-31 RX ADMIN — Medication 1 EACH: at 21:08

## 2025-05-31 RX ADMIN — IBUPROFEN 600 MG: 600 TABLET ORAL at 21:08

## 2025-05-31 SDOH — HEALTH STABILITY: MENTAL HEALTH: NON-SPECIFIC ACTIVE SUICIDAL THOUGHTS (PAST 1 MONTH): NO

## 2025-05-31 SDOH — SOCIAL STABILITY: SOCIAL INSECURITY: VERBAL ABUSE: DENIES

## 2025-05-31 SDOH — HEALTH STABILITY: MENTAL HEALTH: WISH TO BE DEAD (PAST 1 MONTH): NO

## 2025-05-31 SDOH — HEALTH STABILITY: MENTAL HEALTH: HAVE YOU USED ANY SUBSTANCES (CANABIS, COCAINE, HEROIN, HALLUCINOGENS, INHALANTS, ETC.) IN THE PAST 12 MONTHS?: NO

## 2025-05-31 SDOH — ECONOMIC STABILITY: HOUSING INSECURITY: DO YOU FEEL UNSAFE GOING BACK TO THE PLACE WHERE YOU ARE LIVING?: NO

## 2025-05-31 SDOH — HEALTH STABILITY: MENTAL HEALTH: CURRENT SMOKER: 0

## 2025-05-31 SDOH — SOCIAL STABILITY: SOCIAL INSECURITY: ARE THERE ANY APPARENT SIGNS OF INJURIES/BEHAVIORS THAT COULD BE RELATED TO ABUSE/NEGLECT?: NO

## 2025-05-31 SDOH — SOCIAL STABILITY: SOCIAL INSECURITY: HAS ANYONE EVER THREATENED TO HURT YOUR FAMILY OR YOUR PETS?: NO

## 2025-05-31 SDOH — SOCIAL STABILITY: SOCIAL INSECURITY: DO YOU FEEL ANYONE HAS EXPLOITED OR TAKEN ADVANTAGE OF YOU FINANCIALLY OR OF YOUR PERSONAL PROPERTY?: NO

## 2025-05-31 SDOH — SOCIAL STABILITY: SOCIAL INSECURITY: HAVE YOU HAD ANY THOUGHTS OF HARMING ANYONE ELSE?: NO

## 2025-05-31 SDOH — SOCIAL STABILITY: SOCIAL INSECURITY: DOES ANYONE TRY TO KEEP YOU FROM HAVING/CONTACTING OTHER FRIENDS OR DOING THINGS OUTSIDE YOUR HOME?: NO

## 2025-05-31 SDOH — SOCIAL STABILITY: SOCIAL INSECURITY: ARE YOU OR HAVE YOU BEEN THREATENED OR ABUSED PHYSICALLY, EMOTIONALLY, OR SEXUALLY BY ANYONE?: NO

## 2025-05-31 SDOH — SOCIAL STABILITY: SOCIAL INSECURITY: ABUSE SCREEN: ADULT

## 2025-05-31 SDOH — HEALTH STABILITY: MENTAL HEALTH: HAVE YOU USED ANY PRESCRIPTION DRUGS OTHER THAN PRESCRIBED IN THE PAST 12 MONTHS?: NO

## 2025-05-31 SDOH — SOCIAL STABILITY: SOCIAL INSECURITY: HAVE YOU HAD THOUGHTS OF HARMING ANYONE ELSE?: NO

## 2025-05-31 SDOH — HEALTH STABILITY: MENTAL HEALTH: WERE YOU ABLE TO COMPLETE ALL THE BEHAVIORAL HEALTH SCREENINGS?: YES

## 2025-05-31 SDOH — SOCIAL STABILITY: SOCIAL INSECURITY: PHYSICAL ABUSE: DENIES

## 2025-05-31 ASSESSMENT — PAIN SCALES - GENERAL
PAINLEVEL_OUTOF10: 8
PAINLEVEL_OUTOF10: 2
PAINLEVEL_OUTOF10: 2
PAINLEVEL_OUTOF10: 7
PAINLEVEL_OUTOF10: 2
PAINLEVEL_OUTOF10: 0 - NO PAIN

## 2025-05-31 ASSESSMENT — LIFESTYLE VARIABLES
HOW MANY STANDARD DRINKS CONTAINING ALCOHOL DO YOU HAVE ON A TYPICAL DAY: PATIENT DOES NOT DRINK
AUDIT-C TOTAL SCORE: 0
AUDIT-C TOTAL SCORE: 0
SKIP TO QUESTIONS 9-10: 1
HOW OFTEN DO YOU HAVE 6 OR MORE DRINKS ON ONE OCCASION: NEVER
HOW OFTEN DO YOU HAVE A DRINK CONTAINING ALCOHOL: NEVER

## 2025-05-31 ASSESSMENT — PATIENT HEALTH QUESTIONNAIRE - PHQ9
SUM OF ALL RESPONSES TO PHQ9 QUESTIONS 1 & 2: 0
1. LITTLE INTEREST OR PLEASURE IN DOING THINGS: NOT AT ALL
2. FEELING DOWN, DEPRESSED OR HOPELESS: NOT AT ALL

## 2025-05-31 NOTE — SIGNIFICANT EVENT
Fetal heart tones category 1.  Occasional variable.  Overall reassuring  Vaginal exam 580-2.  AROM for clear fluid  Anticipate vaginal delivery

## 2025-05-31 NOTE — PROGRESS NOTES
Patient is on medicine ball bouncing  She is requesting Pitocin turn down  CTX every 1-2 mins  FHT reassuring few mild variables   Pitocin turned down to half at 14 mu/min Will check cervix as desired.  Anticipate vaginal delivery

## 2025-05-31 NOTE — CARE PLAN
The patient's goals for the shift include      The clinical goals for the shift include start induction      Problem: Vaginal Birth or  Section  Goal: No s/sx of infection through recovery  Outcome: Not Progressing  Goal: No s/sx of hemorrhage through recovery  Outcome: Not Progressing

## 2025-05-31 NOTE — H&P
OB Admission H&P    Assessment/Plan    Matti Womack is a 30 y.o.  at 39w3d, TABBY: 2025, by Last Menstrual Period, who presents for Induction of Labor.    39-week risk reducing induction  History of PE on therapeutic Lovenox  Begin Pitocin  Anticipate vaginal delivery  Last dose of Lovenox was on   Will resume for 6 weeks postpartum      Plan  -Admit to L&D, consented  -T&S, CBC, and Syphilis  -Epidural at patient request  -Recheck as clinically indicated by maternal or fetal status  -Plan to initiate induction with Pitocin    Fetal Status  -NST reactive, reassuring   -Presentation Vertex based on ultrasound  -EFW 4000 g by 36-week ultrasound  -GBS negative    Postpartum  Contraception Plan: No contraception desired we will decide at postpartum visit    Pregnancy Problems (from 10/21/24 to present)       Problem Noted Diagnosed Resolved    37 weeks gestation of pregnancy ([facility]) 2024 by Benson Wharton MD  No    Priority:  Medium       Overview Addendum 2025  9:18 AM by Leandro Hirsch MD   Desired provider in labor: [] CNM  [] Physician  [x] Blood Products: [] Yes, accepts [] No, needs counseling  [x] Initial BMI: 36.50   [x] Prenatal Labs:   [x] Cervical Cancer Screening up to date  [x] Rh status: Positive  [x] Genetic Screening:    [x] NT US: (11-13 wks)  [] Baby ASA (if indicated): On Lovenox, risks reviewed declined  [x] Pregnancy dated by: Dates and ultrasound    [x] Anatomy US: (19-20 wks)  [] Federal Sterilization consent signed (if indicated):  [] 1hr GCT at 24-28wks:  [] Rhogam (if indicated):   [] Fetal Surveillance (if indicated):  [] Tdap (27-32 wks, may be given up to 36 wks if initial window missed): Declined  [] RSV (32-36 wks) (Sept. to end of ):  [x] Flu Vaccine: Declined  [x] Breastfeeding:  [] Postpartum Birth control method:   [x] GBS at 36 - 37 wks:  [x] 39 weeks discussion of IOL vs. Expectant management:  [x] Mode of delivery ( anticipated ): Vaginal          Mild intermittent asthma without complication ([facility]) 2019 by Luicna Ward, APRN-CNP  No    Priority:  Medium       Overview Addendum 2024 11:18 AM by Benson Wharton MD   Albuterol PRN         Nausea and vomiting in pregnancy 2024 by Benson Wharton MD  2024 by Leandro Hirsch MD    Overview Signed 2024 11:17 AM by Benson Wharton MD   : rx b6/Doxylamine                 Subjective   Good fetal movement.  Denies vaginal bleeding., Denies contractions., Denies leaking of fluid.      Prenatal Provider Joycelyn    OB History    Para Term  AB Living   5 4 4 0 0 4   SAB IAB Ectopic Multiple Live Births   0 0 0 0 4      # Outcome Date GA Lbr Evens/2nd Weight Sex Type Anes PTL Lv   5 Current            4 Term 20    M Vag-Spont EPI  JESSY   3 Term 19    F Vag-Spont EPI  JESSY   2 Term 17   4.196 kg F Vag-Spont EPI  JESSY   1 Term 14    F Vag-Spont EPI  JESSY       Surgical History[1]    Social History     Tobacco Use    Smoking status: Never    Smokeless tobacco: Never   Substance Use Topics    Alcohol use: Not Currently     Comment: occasional       Allergies[2]    Prescriptions Prior to Admission[3]  Objective     Last Vitals  Temp Pulse Resp BP MAP O2 Sat                   Blood Pressures    No data found in the last 1 encounters.          Physical Exam  General: NAD, mood appropriate  Cardiopulmonary: warm and well perfused, breathing comfortably on room air  Abdomen: Gravid, non-tender  Extremities: Symmetric  Speculum Exam:   Cervix: 3  /50  /  -3         Fetal Monitoring  Baseline: 145 bpm, Variability: moderate,  Accelerations: present and Decelerations: none  Uterine Activity: No contractions seen on toco  Interpretation: Reactive    Bedside ultrasound: Yes    Labs in chart were reviewed.  0   Lab Value Date/Time    GRPBSTREP SEE NOTE 2025 0940     CBC             Prenatal labs reviewed, not remarkable.             [1] No past surgical  history on file.  [2] No Known Allergies  [3]   Medications Prior to Admission   Medication Sig Dispense Refill Last Dose/Taking    cholecalciferol, vitamin D3, 250 mcg (10,000 unit) capsule Take 1 capsule (250 mcg) by mouth 1 (one) time per week. 13 capsule 3     enoxaparin (Lovenox) 40 mg/0.4 mL syringe Inject 0.4 mL (40 mg) under the skin 2 times a day. 60 each 11     Prenatal Vitamin 27 mg iron- 0.8 mg tablet TAKE 1 TABLET BY MOUTH EVERY DAY 30 tablet 0

## 2025-05-31 NOTE — ANESTHESIA PREPROCEDURE EVALUATION
Patient: Matti Womack    Evaluation Method: In-person visit    Procedure Information    Date: 25  Procedure: Labor Consult       Matti Womack is a 30 y.o.  at 39w3d, TABBY: 2025, by Last Menstrual Period, who presents for Induction of Labor.     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 Unsure of instigating factors.    Relevant Problems   Pulmonary   (+) Mild intermittent asthma without complication ([facility])      GYN   (+) 37 weeks gestation of pregnancy ([facility])       Clinical information reviewed:   Tobacco  Allergies  Meds  Problems  Med Hx  Surg Hx   Fam Hx  Soc   Hx        NPO Detail:  NPO/Void Status  Date of Last Liquid: 25  Time of Last Liquid: 828  Date of Last Solid: 25  Time of Last Solid: 828         OB/Gyn Evaluation    Present Pregnancy    Patient is pregnant now.  (+) , anticoagulation use during pregnancy (Takes lovenox for h/o PE. Last dose 25)   Obstetric History                Physical Exam    Airway  Mallampati: II  TM distance: >3 FB  Neck ROM: full  Mouth opening: 3 or more finger widths  Comments: Tongue ring     Cardiovascular - normal exam   Dental - normal exam     Pulmonary - normal exam   Abdominal            Anesthesia Plan    History of general anesthesia?: yes  History of complications of general anesthesia?: no    ASA 3     epidural     The patient is not a current smoker.    Anesthetic plan and risks discussed with patient.

## 2025-06-01 VITALS
HEART RATE: 84 BPM | OXYGEN SATURATION: 99 % | HEIGHT: 68 IN | RESPIRATION RATE: 18 BRPM | TEMPERATURE: 98.4 F | SYSTOLIC BLOOD PRESSURE: 140 MMHG | BODY MASS INDEX: 42.58 KG/M2 | DIASTOLIC BLOOD PRESSURE: 90 MMHG | WEIGHT: 280.98 LBS

## 2025-06-01 PROBLEM — Z3A.39 39 WEEKS GESTATION OF PREGNANCY (HHS-HCC): Status: ACTIVE | Noted: 2024-11-05

## 2025-06-01 PROBLEM — Z34.90 TERM PREGNANCY (HHS-HCC): Status: RESOLVED | Noted: 2025-05-31 | Resolved: 2025-06-01

## 2025-06-01 LAB
ALBUMIN SERPL BCP-MCNC: 3.6 G/DL (ref 3.4–5)
ALP SERPL-CCNC: 100 U/L (ref 33–110)
ALT SERPL W P-5'-P-CCNC: 14 U/L (ref 7–45)
ANION GAP SERPL CALC-SCNC: 16 MMOL/L (ref 10–20)
AST SERPL W P-5'-P-CCNC: 21 U/L (ref 9–39)
BILIRUB SERPL-MCNC: 1 MG/DL (ref 0–1.2)
BUN SERPL-MCNC: 8 MG/DL (ref 6–23)
CALCIUM SERPL-MCNC: 9.3 MG/DL (ref 8.6–10.3)
CHLORIDE SERPL-SCNC: 106 MMOL/L (ref 98–107)
CO2 SERPL-SCNC: 18 MMOL/L (ref 21–32)
CREAT SERPL-MCNC: 0.62 MG/DL (ref 0.5–1.05)
EGFRCR SERPLBLD CKD-EPI 2021: >90 ML/MIN/1.73M*2
GLUCOSE SERPL-MCNC: 180 MG/DL (ref 74–99)
POTASSIUM SERPL-SCNC: 3.7 MMOL/L (ref 3.5–5.3)
PROT SERPL-MCNC: 6.3 G/DL (ref 6.4–8.2)
SODIUM SERPL-SCNC: 136 MMOL/L (ref 136–145)
TREPONEMA PALLIDUM IGG+IGM AB [PRESENCE] IN SERUM OR PLASMA BY IMMUNOASSAY: NONREACTIVE

## 2025-06-01 PROCEDURE — 2500000001 HC RX 250 WO HCPCS SELF ADMINISTERED DRUGS (ALT 637 FOR MEDICARE OP): Performed by: OBSTETRICS & GYNECOLOGY

## 2025-06-01 PROCEDURE — 59050 FETAL MONITOR W/REPORT: CPT

## 2025-06-01 PROCEDURE — 36415 COLL VENOUS BLD VENIPUNCTURE: CPT | Performed by: OBSTETRICS & GYNECOLOGY

## 2025-06-01 PROCEDURE — 99238 HOSP IP/OBS DSCHRG MGMT 30/<: CPT | Performed by: MIDWIFE

## 2025-06-01 PROCEDURE — 2720000007 HC OR 272 NO HCPCS

## 2025-06-01 PROCEDURE — 7210000002 HC LABOR PER HOUR

## 2025-06-01 PROCEDURE — 1100000001 HC PRIVATE ROOM DAILY

## 2025-06-01 PROCEDURE — 80053 COMPREHEN METABOLIC PANEL: CPT | Performed by: OBSTETRICS & GYNECOLOGY

## 2025-06-01 PROCEDURE — 7100000016 HC LABOR RECOVERY PER HOUR

## 2025-06-01 RX ORDER — IBUPROFEN 600 MG/1
600 TABLET, FILM COATED ORAL EVERY 6 HOURS
Qty: 120 TABLET | Refills: 0 | Status: SHIPPED | OUTPATIENT
Start: 2025-06-02 | End: 2025-07-02

## 2025-06-01 RX ORDER — ACETAMINOPHEN 325 MG/1
975 TABLET ORAL EVERY 6 HOURS
Qty: 360 TABLET | Refills: 0 | Status: SHIPPED | OUTPATIENT
Start: 2025-06-02 | End: 2025-07-02

## 2025-06-01 RX ORDER — AMOXICILLIN 250 MG
1 CAPSULE ORAL DAILY
Qty: 14 TABLET | Refills: 0 | Status: SHIPPED | OUTPATIENT
Start: 2025-06-01 | End: 2025-06-15

## 2025-06-01 RX ADMIN — ACETAMINOPHEN 975 MG: 325 TABLET, FILM COATED ORAL at 15:35

## 2025-06-01 RX ADMIN — IBUPROFEN 600 MG: 600 TABLET ORAL at 15:35

## 2025-06-01 RX ADMIN — IBUPROFEN 600 MG: 600 TABLET ORAL at 20:24

## 2025-06-01 RX ADMIN — IBUPROFEN 600 MG: 600 TABLET ORAL at 09:22

## 2025-06-01 RX ADMIN — ACETAMINOPHEN 975 MG: 325 TABLET, FILM COATED ORAL at 03:09

## 2025-06-01 RX ADMIN — ACETAMINOPHEN 975 MG: 325 TABLET, FILM COATED ORAL at 09:22

## 2025-06-01 RX ADMIN — ACETAMINOPHEN 975 MG: 325 TABLET, FILM COATED ORAL at 20:24

## 2025-06-01 RX ADMIN — IBUPROFEN 600 MG: 600 TABLET ORAL at 03:09

## 2025-06-01 ASSESSMENT — PAIN SCALES - GENERAL
PAINLEVEL_OUTOF10: 0 - NO PAIN
PAINLEVEL_OUTOF10: 1
PAINLEVEL_OUTOF10: 0 - NO PAIN
PAINLEVEL_OUTOF10: 1
PAINLEVEL_OUTOF10: 0 - NO PAIN

## 2025-06-01 NOTE — LACTATION NOTE
Lactation Consultant Note  Lactation Consultation  Reason for Consult: Initial assessment  Consultant Name: Nadya ZAVALA    Maternal Information  Has mother  before?: Yes  How long did the mother previously breastfeed?: briefly wit her first and second child  Infant to breast within first 2 hours of birth?: Yes  Exclusive Pump and Bottle Feed: No    Maternal Assessment       Infant Assessment  Infant Behavior: Deep sleep    Feeding Assessment       LATCH TOOL       Breast Pump       Other OB Lactation Tools       Patient Follow-up  Inpatient Lactation Follow-up Needed : Yes    Other OB Lactation Documentation       Recommendations/Summary  During this initial visit, mom reports that baby is latching well. Breast feeding education begun. Reviewed milk supply patterns and  feeding patterns in the fist and second 24 HOL. Mom was asked to attempt/feed baby at least every 2-3 hours or on demand with a goal of 8-12 feeds daily. Feeding cues reviewed. Mom was asked to call out for feed/latch observation the next time she feeds baby. Breastfeeding education reviewed and questions answered. Mom aware of lactation support and asked to call out for feed assistance and with questions as needed.

## 2025-06-01 NOTE — L&D DELIVERY NOTE
Vaginal Delivery Note    Patient Name: Matti Womack  : 1995  MRN: [id number]  Age: 30 y.o.    /Para:   Gestational Age: 39w3d    Date of Delivery: 2025    Procedure: Normal Spontaneous Vaginal Delivery    Delivery Provider: MD Aristeo    Resident/Fellow/Other Assistant: None    Description of Procedure:  Delivery of viable infant under no anesthesia. Delayed clamping was performed. The infant was placed skin to skin. Cord gases were not sent.  Cord blood was collected. Placenta delivered intact and fundus was firm    0 Laceration identified and repaired.    Findings:   Amniotic fluid Clear, Female infant in Vertex Occiput Anterior presentation, APGARS 9 , 10 .  Birth Weight  .    Complications: None    Quantitative Blood Loss:   Delivery Blood Loss   Intrapartum & Postpartum: 25 0821 - 25    Delivery Admission: 25 0801 - 25         Intrapartum & Postpartum Delivery Admission    None               Blood products:      Uterotonics/Hemostatic Agent: IV Pitocin 30 units    Specimen:   Placenta  Delivered: 2025  8:17 PM  Appearance: Intact  Removal: Spontaneous    Disposition: discarded    Sponge/Instrument/Needle Counts: The sponge, lap and needle counts were correct.    Patient Disposition: Patient recovering on labor and delivery in stable condition.    Additional Procedures:  None    Matti WomackSteve [[id number]]      Labor Events    Sac identifier: Sac 1  Rupture date/time: 2025 1835  Rupture type: Artificial  Fluid color: Clear  Fluid odor: None  Labor type: Induced Onset of Labor  Labor allowed to proceed with plans for an attempted vaginal birth?: Yes  Induction: Oxytocin, AROM  Induction indications: Risk Reducing  Complications: None       Labor Event Times    Dilation complete date/time: 2025       Placenta    Placenta delivery date/time: 2025 20:17  Placenta removal: Spontaneous  Placenta appearance: Intact  Placenta  disposition: discarded       Cord    Vessels: 3 vessels  Complications: None  Delayed cord clamping?: Yes  Cord clamped date/time: 2025 20:15:34  Cord blood disposition: Lab  Gases sent?: No  Stem cell collection (by provider): No       Lacerations    Episiotomy: None  Perineal laceration: None  Other lacerations?: No  Repair suture: None       Anesthesia    Method: None       Shoulder Dystocia    Shoulder dystocia present?: No        Delivery    Birth date/time: 2025 20:13:00  Delivery type: Vaginal, Spontaneous  Complications: None       Apgars    Living status: Living  Apgar Component Scores:  1 min.:  5 min.:  10 min.:  15 min.:  20 min.:    Skin color:  1  2       Heart rate:  2  2       Reflex irritability:  2  2       Muscle tone:  2  2       Respiratory effort:  2  2       Total:  9  10              Delivery Providers    Delivering clinician:    Provider Role     Delivery Nurse     Nursery Nurse     Resident

## 2025-06-01 NOTE — PROGRESS NOTES
Postpartum Progress Note    Assessment/Plan   Matti Womack is a 30 y.o., , who delivered at 39w3d gestation and is now postpartum day 1.    Postpartum Day 1 following   Breastfeeding  No complications    Subjective   Her pain is well controlled with current medications  She is passing flatus  She is ambulating well  She is tolerating a Adult diet Regular  She reports no breast or nursing problems  She denies emotional concerns today   Her plan for contraception is none     She reports she is coping well and denies any concerns at this time. She is bonding well and has support at home. She is breast feeding with no issues. She is urinating without pain or difficulty and has not had a BM yet. We discussed bowel promotion. Her bleeding is well controlled and she denies any clots. She denies any headache, chest pain, or shortness of breath. She states she would like to discharge home today if possible.     Objective     Last Vitals:  Temp Pulse Resp BP MAP Pulse Ox   36.3 °C (97.3 °F) 88 17 (!) 140/87 100 98 %     Vitals Min/Max Last 24 Hours:  Temp  Min: 36.3 °C (97.3 °F)  Max: 37.3 °C (99.1 °F)  Pulse  Min: 70  Max: 131  Resp  Min: 16  Max: 18  BP  Min: 108/55  Max: 143/84  MAP (mmHg)  Min: 77  Max: 107    Intake/Output:     Intake/Output Summary (Last 24 hours) at 2025 1402  Last data filed at 2025 2153  Gross per 24 hour   Intake 845.37 ml   Output 623 ml   Net 222.37 ml       Physical Exam:  General: Examination reveals a well developed, well nourished, female, in no acute distress. She is alert and cooperative.  HEENT: PERRLA. External ears normal. Nose normal, no erythema or discharge. Mouth and throat clear.  Neck: supple, no significant adenopathy.  Lungs: clear to auscultation bilaterally.  Cardiac: regular rate and rhythm, S1, S2 normal, no murmur, click, rub or gallop.  Breasts: breasts appear WNL  Abdomen: soft, nontender, nondistended, no abnormal masses, no epigastric pain,  obese.  Fundus: firm and at umbilicus.  Perineum: deferred.  Extremities: no redness or tenderness in the calves or thighs, mild bilateral nonpitting edema.   Neurological: alert, oriented, normal speech, no focal findings or movement disorder noted.  Psychological: awake and alert; oriented to person, place, and time.        Lab Data:  Labs in chart were reviewed.

## 2025-06-02 NOTE — DISCHARGE SUMMARY
Discharge Summary    Admission Date: 5/31/2025  Discharge Date: 6/1/2025    Discharge Diagnosis  Term pregnancy ([facility])  Gestational hypertension   H/o PE, on lovenox outside of pregnancy   Breastfeeding mother      Hospital Course  Delivery Date: 5/31/2025 8:13 PM  Delivery type: Vaginal, Spontaneous   GA at delivery: 39w3d  Outcome: Living  Anesthesia during delivery: None  Intrapartum complications: None  Feeding method: Breastfeeding Status: Yes     Procedures: none  Contraception at discharge: none - declines     Pt desires discharge and NB has met discharge criteria. Pt is feeling and doing well and has no concerns related to going home. She endorses effective coping, bonding, and help at home. Pain is well managed and bleeding is minimal. She would like to take a stool softener script home to have on hand; she declines contraception. She endorses that she has plenty of lovenox at home and does not need any refills or education related to administering. We reviewed normal vs abnormal recovery, s/s and RF for thrombus, routine PP care, and returning to L&D triage with any concerns. She verbalized understanding, had no questions, and will call Dr. Hirsch's office tomorrow to arrange follow up care. Pt discharged in stable condition by request.      Pertinent Physical Exam At Time of Discharge  Unchanged from this afternoon       Last Vitals:  Temp Pulse Resp BP MAP Pulse Ox   36.9 °C (98.4 °F) 84 18 (!) 140/90 98 99 %     Discharge Meds     Your medication list        START taking these medications        Instructions Last Dose Given Next Dose Due   acetaminophen 325 mg tablet  Commonly known as: Tylenol  Start taking on: June 2, 2025      Take 3 tablets (975 mg) by mouth every 6 hours.       ibuprofen 600 mg tablet  Start taking on: June 2, 2025      Take 1 tablet (600 mg) by mouth every 6 hours.       sennosides-docusate sodium 8.6-50 mg tablet  Commonly known as: Maria Elena-Colace      Take 1 tablet by mouth once  daily for 14 days.              CONTINUE taking these medications        Instructions Last Dose Given Next Dose Due   cholecalciferol (vitamin D3) 250 mcg (10,000 unit) capsule      Take 1 capsule (250 mcg) by mouth 1 (one) time per week.       enoxaparin 40 mg/0.4 mL syringe  Commonly known as: Lovenox      Inject 0.4 mL (40 mg) under the skin 2 times a day.       Prenatal Vitamin 27 mg iron- 0.8 mg tablet  Generic drug: prenatal vitamin (iron-folic)      TAKE 1 TABLET BY MOUTH EVERY DAY                 Where to Get Your Medications        These medications were sent to [facility]pharmacy #[id number] [facility] [id number] [facility]  [address] [facility] [address]      Phone: [phone]   acetaminophen 325 mg tablet  ibuprofen 600 mg tablet  sennosides-docusate sodium 8.6-50 mg tablet          Complications Requiring Follow-Up  BP monitoring at home twice daily and follow up in office this week for BP check   Continue maintenance lovenox  Routine PP care otherwise      Test Results Pending At Discharge  Pending Labs       No current pending labs.            Outpatient Follow-Up  No future appointments.    I spent 10 minutes in the professional and overall care of this patient.      AXEL Schaefer-DEVI

## 2025-06-02 NOTE — CARE PLAN
The patient's goals for the shift include Education for discharge    The clinical goals for the shift include Free of injury    Problem: Vaginal Birth or  Section  Goal: Fetal and maternal status remain reassuring during the birth process  Outcome: Met  Goal: Prevention of malpresentation/labor dystocia through delivery  Outcome: Met  Goal: Demonstrates labor coping techniques through delivery  Outcome: Met  Goal: Minimal s/sx of HDP and BP<160/110  Outcome: Met  Goal: No s/sx of infection through recovery  Outcome: Met  Goal: No s/sx of hemorrhage through recovery  Outcome: Met

## 2025-06-04 ENCOUNTER — TELEPHONE (OUTPATIENT)
Dept: OBSTETRICS AND GYNECOLOGY | Facility: HOSPITAL | Age: 30
End: 2025-06-04

## 2025-06-04 NOTE — NURSING NOTE
Follow up discharge phone call placed. Pt had  on  complicated by GHTN. Pt states she is recovering well. Continues to take tylenol and motrin for vaginal soreness. Pt states that motrin has been more effective than tylenol. Endorses good emotional health. Denies any feelings of anxiety or depression. Pt has pp visit scheduled for 7/3/25.

## 2025-07-01 ENCOUNTER — HOSPITAL ENCOUNTER (EMERGENCY)
Facility: HOSPITAL | Age: 30
Discharge: HOME | End: 2025-07-01
Payer: COMMERCIAL

## 2025-07-01 VITALS
RESPIRATION RATE: 18 BRPM | DIASTOLIC BLOOD PRESSURE: 82 MMHG | TEMPERATURE: 98.5 F | SYSTOLIC BLOOD PRESSURE: 124 MMHG | HEART RATE: 87 BPM | OXYGEN SATURATION: 100 %

## 2025-07-01 DIAGNOSIS — J02.9 SORE THROAT: Primary | ICD-10-CM

## 2025-07-01 DIAGNOSIS — J02.9 PHARYNGITIS, UNSPECIFIED ETIOLOGY: ICD-10-CM

## 2025-07-01 LAB
FLUAV RNA RESP QL NAA+PROBE: NOT DETECTED
FLUBV RNA RESP QL NAA+PROBE: NOT DETECTED
RSV RNA RESP QL NAA+PROBE: NOT DETECTED
S PYO DNA THROAT QL NAA+PROBE: NOT DETECTED
SARS-COV-2 RNA RESP QL NAA+PROBE: NOT DETECTED

## 2025-07-01 PROCEDURE — 2500000001 HC RX 250 WO HCPCS SELF ADMINISTERED DRUGS (ALT 637 FOR MEDICARE OP)

## 2025-07-01 PROCEDURE — 87637 SARSCOV2&INF A&B&RSV AMP PRB: CPT

## 2025-07-01 PROCEDURE — 99283 EMERGENCY DEPT VISIT LOW MDM: CPT

## 2025-07-01 PROCEDURE — 87651 STREP A DNA AMP PROBE: CPT

## 2025-07-01 RX ORDER — IBUPROFEN 600 MG/1
600 TABLET, FILM COATED ORAL EVERY 6 HOURS PRN
Qty: 28 TABLET | Refills: 0 | Status: SHIPPED | OUTPATIENT
Start: 2025-07-01 | End: 2025-07-08

## 2025-07-01 RX ORDER — ACETAMINOPHEN 500 MG
1000 TABLET ORAL EVERY 6 HOURS PRN
Qty: 30 TABLET | Refills: 0 | Status: SHIPPED | OUTPATIENT
Start: 2025-07-01 | End: 2025-07-11

## 2025-07-01 RX ORDER — BENZOCAINE/MENTHOL 15 MG-10MG
1 LOZENGE MUCOUS MEMBRANE EVERY 2 HOUR PRN
Qty: 15 LOZENGE | Refills: 0 | Status: SHIPPED | OUTPATIENT
Start: 2025-07-01

## 2025-07-01 RX ORDER — ACETAMINOPHEN 325 MG/1
650 TABLET ORAL ONCE
Status: COMPLETED | OUTPATIENT
Start: 2025-07-01 | End: 2025-07-01

## 2025-07-01 RX ADMIN — ACETAMINOPHEN 650 MG: 325 TABLET ORAL at 18:50

## 2025-07-01 ASSESSMENT — PAIN SCALES - GENERAL: PAINLEVEL_OUTOF10: 10 - WORST POSSIBLE PAIN

## 2025-07-01 ASSESSMENT — PAIN - FUNCTIONAL ASSESSMENT: PAIN_FUNCTIONAL_ASSESSMENT: 0-10

## 2025-07-01 NOTE — ED PROVIDER NOTES
HPI   CC: sore throat x 2 days     Patient is a 30-year-old female with PMH prior PE not on anticoagulation, 4 weeks postpartum presenting to the ED with concern for sore throat.  Patient states her symptoms started at 7 PM last night and was worse this morning when she woke up.  She reports that she is prone to frequent strep infections and this feels similar to that.  When she woke up she looked in her throat and saw white spots on her tonsils.  She has tried taking DayQuil and Tylenol at home with no relief of symptoms.  Additionally endorses congestion and odynophagia.  Denies dysphagia, nausea, vomiting, fever, chills, neck pain/stiffness, drooling, change in voice, diarrhea, abdominal pain, cough, chest pain, shortness of breath, urinary symptoms.        ROS: 10-point review of systems was performed and is otherwise negative except as noted in HPI.    Limitations to history: N/A  Independent Historians: Self   External Records Reviewed: Outpatient notes in EMR    Past Medical History: Noncontributory except per HPI     Past Surgical History: Noncontributory except per HPI     Family History: Reviewed and noncontributory     Social History:  Denies tobacco. Denies ETOH. Denies illicit drugs.    RX Allergies[1]    Home Meds:   Current Outpatient Medications   Medication Instructions    acetaminophen (TYLENOL) 975 mg, oral, Every 6 hours    acetaminophen (TYLENOL) 1,000 mg, oral, Every 6 hours PRN    benzocaine-menthol (Chloraseptic Max) lozenge 1 lozenge, Mouth/Throat, Every 2 hour PRN    cholecalciferol (vitamin D3) 250 mcg, oral, Once Weekly    enoxaparin (LOVENOX) 40 mg, subcutaneous, 2 times daily    ibuprofen 600 mg, oral, Every 6 hours    ibuprofen 600 mg, oral, Every 6 hours PRN    Prenatal Vitamin 27 mg iron- 0.8 mg tablet 1 tablet, oral, Daily        Physical Exam     ED Triage Vitals [07/01/25 1831]   Temperature Heart Rate Respirations BP   36.9 °C (98.5 °F) 95 20 123/86      Pulse Ox Temp Source  Heart Rate Source Patient Position   100 % Oral Monitor --      BP Location FiO2 (%)     -- --         Vitals and nursing note reviewed.   Physical Exam  Constitutional:       General: She is not in acute distress.     Appearance: Normal appearance. She is not ill-appearing, toxic-appearing or diaphoretic.   HENT:      Head: Normocephalic and atraumatic.      Right Ear: Tympanic membrane, ear canal and external ear normal. There is no impacted cerumen.      Left Ear: Tympanic membrane, ear canal and external ear normal. There is no impacted cerumen.      Nose: Nose normal.      Mouth/Throat:      Mouth: Mucous membranes are moist.      Pharynx: Oropharynx is clear. Posterior oropharyngeal erythema present. No oropharyngeal exudate.      Tonsils: Tonsillar exudate present. 2+ on the right. 2+ on the left.      Comments: Normal phonation.  No stridor, no trismus.  Patient is tolerating secretions without tripod positioning or drooling. Able to fit 3 fingers in mouth.  No pain with manipulation of thyroid cartilage.  Eyes:      General: No scleral icterus.        Right eye: No discharge.         Left eye: No discharge.      Extraocular Movements: Extraocular movements intact.      Conjunctiva/sclera: Conjunctivae normal.      Pupils: Pupils are equal, round, and reactive to light.   Cardiovascular:      Rate and Rhythm: Normal rate and regular rhythm.      Heart sounds: Normal heart sounds. No murmur heard.     No friction rub. No gallop.   Pulmonary:      Effort: Pulmonary effort is normal. No respiratory distress.      Breath sounds: Normal breath sounds. No stridor. No wheezing, rhonchi or rales.   Abdominal:      General: Abdomen is flat. Bowel sounds are normal. There is no distension.      Palpations: Abdomen is soft.      Tenderness: There is no abdominal tenderness. There is no right CVA tenderness, left CVA tenderness, guarding or rebound.   Musculoskeletal:         General: Normal range of motion.       Cervical back: Normal range of motion and neck supple. No rigidity or tenderness.   Lymphadenopathy:      Cervical: No cervical adenopathy.   Skin:     General: Skin is warm and dry.      Capillary Refill: Capillary refill takes less than 2 seconds.   Neurological:      General: No focal deficit present.      Mental Status: She is alert and oriented to person, place, and time.   Psychiatric:         Mood and Affect: Mood normal.         Behavior: Behavior normal.         Diagnostic Results      Labs Reviewed   GROUP A STREPTOCOCCUS, PCR - Normal       Result Value    Group A Strep PCR Not Detected     SARS-COV-2, INFLUENZA A/B AND RSV PCR - Normal    Coronavirus 2019, PCR Not Detected      Flu A Result Not Detected      Flu B Result Not Detected      RSV PCR Not Detected      Narrative:     This assay is an FDA-cleared, in vitro diagnostic nucleic acid amplification test for the qualitative detection and differentiation of SARS CoV-2/ Influenza A/B/ RSV from nasopharyngeal specimens collected from individuals with signs and symptoms of respiratory tract infections, and has been validated for use at [facility]. Negative results do not preclude COVID-19/ Influenza A/B/ RSV infections and should not be used as the sole basis for diagnosis, treatment, or other management decisions. Testing for SARS CoV-2 is recommended only for patients who meet current clinical and/or epidemiological criteria defined by federal, state, or local public health directives.         No orders to display       ED Course & MDM   Assessment/Plan:   Medications   acetaminophen (Tylenol) tablet 650 mg (650 mg oral Given 7/1/25 1850)      ED Course as of 07/01/25 2018 Tue Jul 01, 2025 2017 Patient is a 30-year-old female presenting to the ED with concern for sore throat.  Vital signs are stable, patient is nontoxic-appearing.  On exam she has full range of motion of her neck with no signs of meningismus.  Lower  suspicion for meningitis, retropharyngeal abscess.  There is no evidence of Jassi's angina or peritonsillar abscess on exam.  Patient was swabbed for COVID, flu, SP, and strep which came back negative.  Suspect viral pharyngitis.  Patient educated about mono.  Educated patient that testing does not become positive for up to 3 weeks after symptoms of onset.  Recommend she avoid contact sports until symptoms have resolved.  She was given Tylenol in ED.  Prescriptions for Tylenol, Motrin, and Chloraseptic lozenges sent to the pharmacy.  Encouraged increased fluid intake.  Patient states she does not have a PCP so referral order was placed and encouraged follow-up within 1 week to ensure symptoms are improving.  Patient is appropriate for outpatient management.  Patient understands and is agreeable with plan. Patient told to return to ED for any new or worsening symptoms. [VS]      ED Course User Index  [VS] Ela Griffiths PA-C         Diagnoses as of 07/01/25 2018   Sore throat   Pharyngitis, unspecified etiology       ED Prescriptions       Medication Sig Dispense Start Date End Date Auth. Provider    acetaminophen (Tylenol) 500 mg tablet Take 2 tablets (1,000 mg) by mouth every 6 hours if needed for mild pain (1 - 3) for up to 10 days. 30 tablet 7/1/2025 7/11/2025 Ela Griffiths PA-C    ibuprofen 600 mg tablet Take 1 tablet (600 mg) by mouth every 6 hours if needed for mild pain (1 - 3) for up to 7 days. 28 tablet 7/1/2025 7/8/2025 Ela Griffiths PA-C    benzocaine-menthol (Chloraseptic Max) lozenge Dissolve 1 lozenge in the mouth every 2 hours if needed for sore throat. 15 lozenge 7/1/2025 -- Ela Griffiths PA-C            Chronic Medical Conditions Significantly Affecting Care:      Escalation of Care: Appropriate for outpatient management    Counseling: Spoke with the patient and discussed today´s findings, in addition to providing specific details for the plan of care and  expected course.  Patient was given the opportunity to ask questions.    Discussed return precautions and importance of follow-up.  Advised to follow-up with PCP.  Advised to return to the ED for changing or worsening symptoms, new symptoms, complaint specific precautions, and precautions listed on the discharge paperwork.  Educated on the common potential side effects of medications prescribed.    I advised the patient that the emergency evaluation and treatment provided today doesn't end their need for medical care. It is very important that they follow-up with their primary care provider or other specialist as instructed.    The plan of care was mutually agreed upon with the patient. The patient and/or family were given the opportunity to ask questions. All questions asked today in the ED were answered to the best of my ability with today's information.    I specifically advised the patient to return to the ED for changing or worsening symptoms, worrisome new symptoms, or for any complaint specific precautions listed on the discharge paperwork.    Procedure  Procedures         [1] No Known Allergies       Ela Griffiths PA-C  07/01/25 2018

## 2025-07-02 NOTE — DISCHARGE INSTRUCTIONS
Can alternate taking tylenol and motrin as needed for fever and body aches  May use medicated lozenges as needed for sore throat  Please increase your fluid intake  Follow up with your primary doctor within the next couple of days for repeat evaluation to ensure symptoms are improving  Referral for primary care placed. Please follow up for further evaluation and management. You can stop at the  in waiting room to schedule an appointment before you leave today.  Return to ED for any new or worsening symptoms including but not limited to spiking fevers, uncontrollable vomiting, trouble breathing, chest pain, coughing up blood, or any other concern

## 2025-07-03 ENCOUNTER — APPOINTMENT (OUTPATIENT)
Dept: OBSTETRICS AND GYNECOLOGY | Facility: CLINIC | Age: 30
End: 2025-07-03
Payer: COMMERCIAL

## 2025-07-03 VITALS
HEIGHT: 68 IN | DIASTOLIC BLOOD PRESSURE: 78 MMHG | BODY MASS INDEX: 37.89 KG/M2 | WEIGHT: 250 LBS | SYSTOLIC BLOOD PRESSURE: 126 MMHG

## 2025-07-03 DIAGNOSIS — Z86.711 HISTORY OF PULMONARY EMBOLISM: ICD-10-CM

## 2025-07-03 DIAGNOSIS — Z00.00 HEALTHCARE MAINTENANCE: Primary | ICD-10-CM

## 2025-07-03 PROBLEM — Z3A.39 39 WEEKS GESTATION OF PREGNANCY (HHS-HCC): Status: RESOLVED | Noted: 2024-11-05 | Resolved: 2025-05-31

## 2025-07-03 PROBLEM — J45.20 MILD INTERMITTENT ASTHMA WITHOUT COMPLICATION (HHS-HCC): Status: RESOLVED | Noted: 2019-02-01 | Resolved: 2025-05-31

## 2025-07-03 LAB
POC BLOOD, URINE: NEGATIVE
POC GLUCOSE, URINE: NEGATIVE MG/DL
POC KETONES, URINE: NEGATIVE MG/DL
POC LEUKOCYTES, URINE: NEGATIVE
POC NITRITE,URINE: NEGATIVE
POC PROTEIN, URINE: NEGATIVE MG/DL

## 2025-07-03 RX ORDER — B-COMPLEX WITH VITAMIN C
1 TABLET ORAL DAILY
Qty: 90 TABLET | Refills: 0 | Status: SHIPPED | OUTPATIENT
Start: 2025-07-03

## 2025-07-03 RX ORDER — ENOXAPARIN SODIUM 100 MG/ML
40 INJECTION SUBCUTANEOUS DAILY
Qty: 30 EACH | Refills: 0 | Status: SHIPPED | OUTPATIENT
Start: 2025-07-03 | End: 2026-07-03

## 2025-07-03 ASSESSMENT — EDINBURGH POSTNATAL DEPRESSION SCALE (EPDS)
TOTAL SCORE: 3
I HAVE BEEN SO UNHAPPY THAT I HAVE HAD DIFFICULTY SLEEPING: NOT AT ALL
I HAVE LOOKED FORWARD WITH ENJOYMENT TO THINGS: AS MUCH AS I EVER DID
I HAVE BEEN ANXIOUS OR WORRIED FOR NO GOOD REASON: YES, SOMETIMES
I HAVE FELT SCARED OR PANICKY FOR NO GOOD REASON: NO, NOT AT ALL
I HAVE FELT SAD OR MISERABLE: NO, NOT AT ALL
THE THOUGHT OF HARMING MYSELF HAS OCCURRED TO ME: NEVER
I HAVE BLAMED MYSELF UNNECESSARILY WHEN THINGS WENT WRONG: NOT VERY OFTEN
I HAVE BEEN ABLE TO LAUGH AND SEE THE FUNNY SIDE OF THINGS: AS MUCH AS I ALWAYS COULD
I HAVE BEEN SO UNHAPPY THAT I HAVE BEEN CRYING: NO, NEVER
THINGS HAVE BEEN GETTING ON TOP OF ME: NO, I HAVE BEEN COPING AS WELL AS EVER

## 2025-07-03 ASSESSMENT — ENCOUNTER SYMPTOMS
GASTROINTESTINAL NEGATIVE: 0
ENDOCRINE NEGATIVE: 0
NEUROLOGICAL NEGATIVE: 0
ALLERGIC/IMMUNOLOGIC NEGATIVE: 0
MUSCULOSKELETAL NEGATIVE: 0
EYES NEGATIVE: 0
CARDIOVASCULAR NEGATIVE: 0
PSYCHIATRIC NEGATIVE: 0
CONSTITUTIONAL NEGATIVE: 0
RESPIRATORY NEGATIVE: 0
HEMATOLOGIC/LYMPHATIC NEGATIVE: 0

## 2025-07-03 ASSESSMENT — PAIN SCALES - GENERAL: PAINLEVEL_OUTOF10: 0-NO PAIN

## 2025-07-03 NOTE — PROGRESS NOTES
Postpartum Progress Note    Assessment/Plan   Matti Womack is a 30 y.o., , who delivered at 39w3d gestation and is now postpartum day 33.    Will continue Lovenox through 6 weeks  Denies need for contraception and anticipates 1 more pregnancy  Emphasized to continue prenatal vitamins  Follow-up in October for annual exam    Subjective   Her pain is well controlled with current medications  She is passing flatus  She is ambulating well  She is tolerating a No diet orders on file  She reports no breast or nursing problems  She denies emotional concerns today   Her plan for contraception is none         Objective     Last Vitals:  Temp Pulse Resp BP MAP Pulse Ox         126/78         Vitals Min/Max Last 24 Hours:  @FLOWSTAT(6,8,9,5,309749:24::1)@    Intake/Output:   [unfilled]    Physical Exam:  Abd: soft  Fundus: firm  Ext: NT no edema  Perinium: intact      Lab Data:

## 2025-10-09 ENCOUNTER — APPOINTMENT (OUTPATIENT)
Dept: OBSTETRICS AND GYNECOLOGY | Facility: CLINIC | Age: 30
End: 2025-10-09
Payer: COMMERCIAL